# Patient Record
Sex: MALE | Race: WHITE | NOT HISPANIC OR LATINO | Employment: FULL TIME | URBAN - METROPOLITAN AREA
[De-identification: names, ages, dates, MRNs, and addresses within clinical notes are randomized per-mention and may not be internally consistent; named-entity substitution may affect disease eponyms.]

---

## 2019-07-31 ENCOUNTER — OFFICE VISIT (OUTPATIENT)
Dept: FAMILY MEDICINE CLINIC | Facility: CLINIC | Age: 59
End: 2019-07-31
Payer: COMMERCIAL

## 2019-07-31 VITALS
HEIGHT: 71 IN | DIASTOLIC BLOOD PRESSURE: 80 MMHG | HEART RATE: 80 BPM | WEIGHT: 198 LBS | BODY MASS INDEX: 27.72 KG/M2 | SYSTOLIC BLOOD PRESSURE: 140 MMHG | TEMPERATURE: 98.5 F | OXYGEN SATURATION: 97 %

## 2019-07-31 DIAGNOSIS — Z13.220 SCREENING FOR LIPOID DISORDERS: ICD-10-CM

## 2019-07-31 DIAGNOSIS — Z13.0 SCREENING FOR IRON DEFICIENCY ANEMIA: ICD-10-CM

## 2019-07-31 DIAGNOSIS — Z13.6 SCREENING FOR HYPERTENSION: ICD-10-CM

## 2019-07-31 DIAGNOSIS — Z13.1 SCREENING FOR DIABETES MELLITUS: ICD-10-CM

## 2019-07-31 DIAGNOSIS — Z00.01 ENCOUNTER FOR WELL ADULT EXAM WITH ABNORMAL FINDINGS: Primary | ICD-10-CM

## 2019-07-31 DIAGNOSIS — F17.210 SMOKING GREATER THAN 40 PACK YEARS: ICD-10-CM

## 2019-07-31 DIAGNOSIS — M25.50 MULTIPLE JOINT PAIN: ICD-10-CM

## 2019-07-31 DIAGNOSIS — Z23 NEED FOR PNEUMOCOCCAL VACCINATION: ICD-10-CM

## 2019-07-31 DIAGNOSIS — Z13.29 SCREENING FOR THYROID DISORDER: ICD-10-CM

## 2019-07-31 PROBLEM — M47.817 LUMBOSACRAL SPONDYLOSIS WITHOUT MYELOPATHY: Status: ACTIVE | Noted: 2019-07-31

## 2019-07-31 PROBLEM — M51.26 DISPLACEMENT OF LUMBAR INTERVERTEBRAL DISC WITHOUT MYELOPATHY: Status: ACTIVE | Noted: 2019-07-31

## 2019-07-31 PROBLEM — M50.90 CERVICAL DISC DISEASE: Status: ACTIVE | Noted: 2019-07-31

## 2019-07-31 PROCEDURE — 99213 OFFICE O/P EST LOW 20 MIN: CPT | Performed by: FAMILY MEDICINE

## 2019-07-31 PROCEDURE — 3008F BODY MASS INDEX DOCD: CPT | Performed by: FAMILY MEDICINE

## 2019-07-31 PROCEDURE — 90732 PPSV23 VACC 2 YRS+ SUBQ/IM: CPT | Performed by: FAMILY MEDICINE

## 2019-07-31 PROCEDURE — 99386 PREV VISIT NEW AGE 40-64: CPT | Performed by: FAMILY MEDICINE

## 2019-07-31 PROCEDURE — 4004F PT TOBACCO SCREEN RCVD TLK: CPT | Performed by: FAMILY MEDICINE

## 2019-07-31 PROCEDURE — 90471 IMMUNIZATION ADMIN: CPT | Performed by: FAMILY MEDICINE

## 2019-07-31 RX ORDER — SERTRALINE HYDROCHLORIDE 100 MG/1
TABLET, FILM COATED ORAL
COMMUNITY

## 2019-07-31 RX ORDER — TRAMADOL HYDROCHLORIDE 50 MG/1
TABLET ORAL
COMMUNITY

## 2019-07-31 RX ORDER — ZOLPIDEM TARTRATE 10 MG/1
TABLET ORAL
COMMUNITY

## 2019-07-31 RX ORDER — TADALAFIL 5 MG/1
TABLET ORAL
COMMUNITY

## 2019-07-31 RX ORDER — CYCLOBENZAPRINE HCL 10 MG
TABLET ORAL
COMMUNITY

## 2019-07-31 NOTE — PROGRESS NOTES
FAMILY PRACTICE HEALTH MAINTENANCE OFFICE VISIT  [facility] - [facility]  [facility]    NAME: Odell Lambert  AGE: 63 y o  SEX: male  : 1960     DATE: 2019    Assessment and Plan     Problem List Items Addressed This Visit        Other    Encounter for well adult exam with abnormal findings - Primary     Advice and education were given regarding nutrition, aerobic exercises, weight bearing exercises, cardiovascular risk reduction, fall risk reduction, and age appropriate supplements  The patient was counseled regarding instructions for management, risk factor reductions, prognosis, risks and benefits of treatment options, patient and family education, and importance of compliance with treatment  Will order PSA screening for prostate cancer also patient will have Pneumovax 23 today         Smoking greater than 40 pack years     Tobacco Use/Cessation  i     I advised patient to quit, and offered support  Discussed current use pattern  Asked patient to inform me when they set a quit date     At discussed with the patient complication of for smoking increase the risk of multiple kind of cancer he is aware  We discussed nicotine inhaler proper use patient agreed to try it proper use of medication possible side effect discussed with the patient  The also patient candidate for low dose CT scan of the chest screening for lung cancer we ordered today                       Relevant Medications    nicotine (NICOTROL) 10 MG inhaler    Other Relevant Orders    CT lung screening program    BMI 28 0-28 9,adult     The BMI is above average  BMI counseling and education was provided to the patient   Nutrition recommendations include reducing portion sizes, decreasing overall calorie intake, 3-5 servings of fruits/vegetables daily, reducing fast food intake, consuming healthier snacks, decreasing soda and/or juice intake, moderation in carbohydrate intake and reducing intake of saturated fat and trans fat  Exercise recommendations include moderate aerobic physical activity for 150 minutes/week, exercising 3-5 times per week and joining a gym           Multiple joint pain     New diagnosis symptomatic multiple small joint pain and swelling in bilateral hand plan to do joint workup include C-reactive protein SARMAD rheumatoid factor anti CCP, Lyme titer and vitamin-D e and x-ray of the the bilateral hand         Relevant Orders    XR hand 3+ vw left    XR hand 3+ vw right    Hepatitis C antibody    SARMAD Screen w/ Reflex to Titer/Pattern    C-reactive protein    Lyme Antibody Profile with reflex to WB    RF Screen w/ Reflex to Titer    Vitamin D 25 hydroxy    Cyclic citrul peptide antibody, IgG      Other Visit Diagnoses     Screening for lipoid disorders        Relevant Orders    PSA, Total Screen    CBC and differential    Comprehensive metabolic panel    Lipid Panel with Direct LDL reflex    TSH, 3rd generation with Free T4 reflex    Screening for diabetes mellitus        Relevant Orders    PSA, Total Screen    CBC and differential    Comprehensive metabolic panel    Lipid Panel with Direct LDL reflex    TSH, 3rd generation with Free T4 reflex    Screening for hypertension        Relevant Orders    PSA, Total Screen    CBC and differential    Comprehensive metabolic panel    Lipid Panel with Direct LDL reflex    TSH, 3rd generation with Free T4 reflex    Screening for iron deficiency anemia        Relevant Orders    PSA, Total Screen    CBC and differential    Comprehensive metabolic panel    Lipid Panel with Direct LDL reflex    TSH, 3rd generation with Free T4 reflex    Screening for thyroid disorder        Relevant Orders    PSA, Total Screen    CBC and differential    Comprehensive metabolic panel    Lipid Panel with Direct LDL reflex    TSH, 3rd generation with Free T4 reflex    Need for pneumococcal vaccination        Relevant Orders    PNEUMOCOCCAL POLYSACCHARIDE VACCINE 23-VALENT =>1YO SQ IM (Completed)            · Patient Counseling:   · Nutrition: Stressed importance of a well balanced diet, moderation of sodium/saturated fat, caloric balance and sufficient intake of fiber  · Exercise: Stressed the importance of regular exercise with a goal of 150 minutes per week  · Dental Health: Discussed daily flossing and brushing and regular dental visits     · Immunizations reviewed patient due for a Pneumovax Tdap he will have the PICC Pneumovax 23 today  · Discussed benefits of screening patient due for PSA screening for prostate cancer he did had his colonoscopy at age 49 patient is due for blood work screening for hyperlipidemia and diabetic the patient above age 55 he been smoking for almost 40 years patient is a candidate screening for lung cancer  ·    BMI Counseling: Body mass index is 28 01 kg/m²  Discussed with patient's BMI with him            Chief Complaint     Chief Complaint   Patient presents with    Physical Exam    Hand Pain     bilateral- knuckles       History of Present Illness     The patient here for establish care as a new patient and he had the concerned about the hand pain and swelling  Patient deny any chest pain short of breath no palpitation no headache no blurred vision no weakness or lateralized of the symptom no abdomen pain nausea vomiting or diarrhea no renal problem no rash no fever no change in the weight and no change in the mood patient does do exercise frequently he does not do any special diet patient patient does smoke half pack a day for 40 years he did try the Chantix before and he was unsuccessful to quit smoking  I review medication with the patient his family history surgical history      Well Adult Physical   Patient here for a comprehensive physical exam       Diet and Physical Activity  Diet: Regular   Exercise: frequently      Depression Screen  PHQ-9 Depression Screening    PHQ-9:    Frequency of the following problems over the past two weeks: Little interest or pleasure in doing things:  0 - not at all  Feeling down, depressed, or hopeless:  0 - not at all  PHQ-2 Score:  0          General Health  Hearing: Normal:  bilateral  Vision: previous LASIK surgery  Dental: regular dental visits      The following portions of the patient's history were reviewed and updated as appropriate: allergies, current medications, past family history, past medical history, past social history, past surgical history and problem list     Review of Systems     Review of Systems   Constitutional: Negative for fatigue and fever  HENT: Negative for ear pain, sinus pressure, sinus pain and sore throat  Eyes: Negative for pain and redness  Respiratory: Negative for cough, chest tightness and shortness of breath  Cardiovascular: Negative for chest pain, palpitations and leg swelling  Gastrointestinal: Negative for abdominal pain, blood in stool, constipation, diarrhea and nausea  Endocrine: Negative for heat intolerance and polydipsia  Genitourinary: Negative for flank pain, frequency and hematuria  Musculoskeletal: Positive for arthralgias and joint swelling  Negative for back pain  Skin: Negative for rash  Neurological: Negative for dizziness, numbness and headaches  Hematological: Does not bruise/bleed easily  Psychiatric/Behavioral: Negative for agitation and behavioral problems         Past Medical History     Past Medical History:   Diagnosis Date    Lumbosacral spondylosis without myelopathy 7/31/2019       Past Surgical History     Past Surgical History:   Procedure Laterality Date    SINUS SURGERY         Social History     Social History     Socioeconomic History    Marital status: Unknown     Spouse name: None    Number of children: None    Years of education: None    Highest education level: None   Occupational History    None   Social Needs    Financial resource strain: Not hard at all   [place] insecurity:     Worry: Never true     Inability: Never true    Transportation needs:     Medical: No     Non-medical: No   Tobacco Use    Smoking status: Current Every Day Smoker    Smokeless tobacco: Current User   Substance and Sexual Activity    Alcohol use: Not Currently    Drug use: Never    Sexual activity: Not Currently     Partners: Female   Lifestyle    Physical activity:     Days per week: 0 days     Minutes per session: 0 min    Stress: Not at all   Relationships    Social connections:     Talks on phone: More than three times a week     Gets together: More than three times a week     Attends Gnosticism service: Never     Active member of club or organization: No     Attends meetings of clubs or organizations: Never     Relationship status:     Intimate partner violence:     Fear of current or ex partner: No     Emotionally abused: No     Physically abused: No     Forced sexual activity: No   Other Topics Concern    None   Social History Narrative    None       Family History     Family History   Problem Relation Age of Onset    Breast cancer Mother     Heart disease Father     Cancer Father        Current Medications       Current Outpatient Medications:     cyclobenzaprine (FLEXERIL) 10 mg tablet, cyclobenzaprine 10 mg tablet  TAKE 1 TABLET BY MOUTH THREE TIMES A DAY AS NEEDED, Disp: , Rfl:     sertraline (ZOLOFT) 100 mg tablet, sertraline 100 mg tablet  TAKE 1 TABLET BY MOUTH EVERY DAY, Disp: , Rfl:     tadalafil (CIALIS) 5 MG tablet, tadalafil 5 mg tablet  TAKE 1 TABLET BY MOUTH EVERY DAY IF NEEDED, Disp: , Rfl:     traMADol (ULTRAM) 50 mg tablet, tramadol 50 mg tablet  TAKE 1 TO 2 TABLETS BY MOUTH TWICE A DAY AS NEEDED FOR PAIN, Disp: , Rfl:     zolpidem (AMBIEN) 10 mg tablet, zolpidem 10 mg tablet  TAKE 1 TABLET BY MOUTH AT BEDTIME, Disp: , Rfl:     nicotine (NICOTROL) 10 MG inhaler, Inhale 1 puff as needed for smoking cessation, Disp: 42 each, Rfl: 0     Allergies     Allergies   Allergen Reactions  Shellfish-Derived Products Hives     Other reaction(s): Respiratory distress       Objective     /80   Pulse 80   Temp 98 5 °F (36 9 °C) (Tympanic)   Ht 5' 10 5" (1 791 m)   Wt 89 8 kg (198 lb)   SpO2 97%   BMI 28 01 kg/m²      Physical Exam   Constitutional: He is oriented to person, place, and time  He appears well-developed and well-nourished  HENT:   Head: Normocephalic  Right Ear: External ear normal    Left Ear: External ear normal    Eyes: Conjunctivae and EOM are normal  Right eye exhibits no discharge  Left eye exhibits no discharge  Neck: No JVD present  Cardiovascular: Normal rate, regular rhythm and normal heart sounds  Exam reveals no gallop  No murmur heard  Pulmonary/Chest: Effort normal  No respiratory distress  He has no wheezes  He has no rales  He exhibits no tenderness  Abdominal: He exhibits no mass  There is no tenderness  There is no rebound  Musculoskeletal: He exhibits tenderness  He exhibits no edema  In small joints in B/L hands   Neurological: He is alert and oriented to person, place, and time  Skin: No rash noted  No erythema  Noel Beverly MD  [place] [facility]  BMI Counseling: Body mass index is 28 01 kg/m²  Discussed the patient's BMI with him  The BMI is above average  BMI counseling and education was provided to the patient  Nutrition recommendations include reducing portion sizes, decreasing overall calorie intake, 3-5 servings of fruits/vegetables daily, reducing fast food intake and consuming healthier snacks  Exercise recommendations include moderate aerobic physical activity for 150 minutes/week

## 2019-07-31 NOTE — PATIENT INSTRUCTIONS

## 2019-08-02 ENCOUNTER — APPOINTMENT (OUTPATIENT)
Dept: RADIOLOGY | Facility: MEDICAL CENTER | Age: 59
End: 2019-08-02
Payer: COMMERCIAL

## 2019-08-02 DIAGNOSIS — M25.50 MULTIPLE JOINT PAIN: ICD-10-CM

## 2019-08-02 PROBLEM — Z00.01 ENCOUNTER FOR WELL ADULT EXAM WITH ABNORMAL FINDINGS: Status: ACTIVE | Noted: 2019-08-02

## 2019-08-02 PROBLEM — F17.210 SMOKING GREATER THAN 40 PACK YEARS: Status: ACTIVE | Noted: 2019-07-31

## 2019-08-02 PROBLEM — F17.210 SMOKING GREATER THAN 40 PACK YEARS: Status: ACTIVE | Noted: 2019-08-02

## 2019-08-02 PROBLEM — Z00.01 ENCOUNTER FOR WELL ADULT EXAM WITH ABNORMAL FINDINGS: Status: ACTIVE | Noted: 2019-07-31

## 2019-08-02 PROCEDURE — 73130 X-RAY EXAM OF HAND: CPT

## 2019-08-02 NOTE — ASSESSMENT & PLAN NOTE
Tobacco Use/Cessation  i     I advised patient to quit, and offered support  Discussed current use pattern  Asked patient to inform me when they set a quit date     At discussed with the patient complication of for smoking increase the risk of multiple kind of cancer he is aware  We discussed nicotine inhaler proper use patient agreed to try it proper use of medication possible side effect discussed with the patient  The also patient candidate for low dose CT scan of the chest screening for lung cancer we ordered today

## 2019-08-02 NOTE — PROGRESS NOTES
Subjective:   Chief Complaint   Patient presents with    Physical Exam    Hand Pain     bilateral- knuckles        Patient ID: Odell Lambert is a 63 y o  male  The patient here for establish care as well exam and he is also concerned about the bilateral hand pain and swelling patient for the last couple months he notice swelling in the small joint in his bilateral hand and sometimes is stiffness and no redness no fall no trauma no dryness in the eye no dryness in the mouth no rash no muscle pain and he is not sure if there is any positive family history of rheumatoid arthritis in the family no abdomen pain no change in the color of the urine      The following portions of the patient's history were reviewed and updated as appropriate: allergies, current medications, past family history, past medical history, past social history, past surgical history and problem list     Review of Systems   Constitutional: Negative for fatigue and fever  HENT: Negative for ear pain, sinus pressure, sinus pain and sore throat  Eyes: Negative for pain and redness  Respiratory: Negative for cough, chest tightness and shortness of breath  Cardiovascular: Negative for chest pain, palpitations and leg swelling  Gastrointestinal: Negative for abdominal pain, blood in stool, constipation, diarrhea and nausea  Genitourinary: Negative for flank pain, frequency and hematuria  Musculoskeletal: Positive for arthralgias and joint swelling  Negative for back pain  Skin: Negative for rash  Neurological: Negative for dizziness, numbness and headaches  Hematological: Does not bruise/bleed easily  Objective:  Vitals:    07/31/19 1357   BP: 140/80   Pulse: 80   Temp: 98 5 °F (36 9 °C)   TempSrc: Tympanic   SpO2: 97%   Weight: 89 8 kg (198 lb)   Height: 5' 10 5" (1 791 m)      Physical Exam   Constitutional: He is oriented to person, place, and time  He appears well-developed and well-nourished     HENT:   Head: Normocephalic  Right Ear: External ear normal    Left Ear: External ear normal    Eyes: Conjunctivae and EOM are normal  Right eye exhibits no discharge  Left eye exhibits no discharge  Neck: No JVD present  Cardiovascular: Normal rate, regular rhythm and normal heart sounds  Exam reveals no gallop  No murmur heard  Pulmonary/Chest: Effort normal  No respiratory distress  He has no wheezes  He has no rales  He exhibits no tenderness  Abdominal: He exhibits no mass  There is no tenderness  There is no rebound  Musculoskeletal: He exhibits tenderness  He exhibits no edema  Positive tenderness and swelling on  small joint of bilateral hand   Neurological: He is alert and oriented to person, place, and time  Skin: No rash noted  No erythema  Assessment/Plan:    Encounter for well adult exam with abnormal findings  Advice and education were given regarding nutrition, aerobic exercises, weight bearing exercises, cardiovascular risk reduction, fall risk reduction, and age appropriate supplements  The patient was counseled regarding instructions for management, risk factor reductions, prognosis, risks and benefits of treatment options, patient and family education, and importance of compliance with treatment  Will order PSA screening for prostate cancer also patient will have Pneumovax 23 today    BMI 28 0-28 9,adult  The BMI is above average  BMI counseling and education was provided to the patient  Nutrition recommendations include reducing portion sizes, decreasing overall calorie intake, 3-5 servings of fruits/vegetables daily, reducing fast food intake, consuming healthier snacks, decreasing soda and/or juice intake, moderation in carbohydrate intake and reducing intake of saturated fat and trans fat  Exercise recommendations include moderate aerobic physical activity for 150 minutes/week, exercising 3-5 times per week and joining a gym      Smoking greater than 40 pack years  Tobacco Use/Cessation  i     I advised patient to quit, and offered support  Discussed current use pattern  Asked patient to inform me when they set a quit date     At discussed with the patient complication of for smoking increase the risk of multiple kind of cancer he is aware  We discussed nicotine inhaler proper use patient agreed to try it proper use of medication possible side effect discussed with the patient  The also patient candidate for low dose CT scan of the chest screening for lung cancer we ordered today                  Multiple joint pain  New diagnosis symptomatic multiple small joint pain and swelling in bilateral hand plan to do joint workup include C-reactive protein SARMAD rheumatoid factor anti CCP, Lyme titer and vitamin-D e and x-ray of the the bilateral hand       Diagnoses and all orders for this visit:    Encounter for well adult exam with abnormal findings    Smoking greater than 40 pack years  -     nicotine (NICOTROL) 10 MG inhaler; Inhale 1 puff as needed for smoking cessation  -     CT lung screening program; Future    BMI 28 0-28 9,adult    Multiple joint pain  -     XR hand 3+ vw left; Future  -     XR hand 3+ vw right; Future  -     Hepatitis C antibody; Future  -     SARMAD Screen w/ Reflex to Titer/Pattern; Future  -     C-reactive protein; Future  -     Lyme Antibody Profile with reflex to WB; Future  -     RF Screen w/ Reflex to Titer; Future  -     Vitamin D 25 hydroxy; Future  -     Cyclic citrul peptide antibody, IgG; Future    Screening for lipoid disorders  -     PSA, Total Screen; Future  -     CBC and differential; Future  -     Comprehensive metabolic panel; Future  -     Lipid Panel with Direct LDL reflex; Future  -     TSH, 3rd generation with Free T4 reflex; Future    Screening for diabetes mellitus  -     PSA, Total Screen; Future  -     CBC and differential; Future  -     Comprehensive metabolic panel; Future  -     Lipid Panel with Direct LDL reflex;  Future  -     TSH, 3rd generation with Free T4 reflex; Future    Screening for hypertension  -     PSA, Total Screen; Future  -     CBC and differential; Future  -     Comprehensive metabolic panel; Future  -     Lipid Panel with Direct LDL reflex; Future  -     TSH, 3rd generation with Free T4 reflex; Future    Screening for iron deficiency anemia  -     PSA, Total Screen; Future  -     CBC and differential; Future  -     Comprehensive metabolic panel; Future  -     Lipid Panel with Direct LDL reflex; Future  -     TSH, 3rd generation with Free T4 reflex; Future    Screening for thyroid disorder  -     PSA, Total Screen; Future  -     CBC and differential; Future  -     Comprehensive metabolic panel; Future  -     Lipid Panel with Direct LDL reflex; Future  -     TSH, 3rd generation with Free T4 reflex;  Future    Need for pneumococcal vaccination  -     PNEUMOCOCCAL POLYSACCHARIDE VACCINE 23-VALENT =>3YO SQ IM    Other orders  -     zolpidem (AMBIEN) 10 mg tablet; zolpidem 10 mg tablet   TAKE 1 TABLET BY MOUTH AT BEDTIME  -     traMADol (ULTRAM) 50 mg tablet; tramadol 50 mg tablet   TAKE 1 TO 2 TABLETS BY MOUTH TWICE A DAY AS NEEDED FOR PAIN  -     tadalafil (CIALIS) 5 MG tablet; tadalafil 5 mg tablet   TAKE 1 TABLET BY MOUTH EVERY DAY IF NEEDED  -     sertraline (ZOLOFT) 100 mg tablet; sertraline 100 mg tablet   TAKE 1 TABLET BY MOUTH EVERY DAY  -     cyclobenzaprine (FLEXERIL) 10 mg tablet; cyclobenzaprine 10 mg tablet   TAKE 1 TABLET BY MOUTH THREE TIMES A DAY AS NEEDED

## 2019-08-02 NOTE — ASSESSMENT & PLAN NOTE
Advice and education were given regarding nutrition, aerobic exercises, weight bearing exercises, cardiovascular risk reduction, fall risk reduction, and age appropriate supplements  The patient was counseled regarding instructions for management, risk factor reductions, prognosis, risks and benefits of treatment options, patient and family education, and importance of compliance with treatment       Will order PSA screening for prostate cancer also patient will have Pneumovax 23 today

## 2019-08-02 NOTE — ASSESSMENT & PLAN NOTE
New diagnosis symptomatic multiple small joint pain and swelling in bilateral hand plan to do joint workup include C-reactive protein SARMAD rheumatoid factor anti CCP, Lyme titer and vitamin-D e and x-ray of the the bilateral hand

## 2019-08-08 ENCOUNTER — HOSPITAL ENCOUNTER (OUTPATIENT)
Dept: RADIOLOGY | Age: 59
Discharge: HOME | End: 2019-08-08
Payer: COMMERCIAL

## 2019-08-08 DIAGNOSIS — F17.210 SMOKING GREATER THAN 40 PACK YEARS: ICD-10-CM

## 2019-08-08 PROCEDURE — G0297 LDCT FOR LUNG CA SCREEN: HCPCS

## 2019-08-23 LAB — HCV AB SER-ACNC: <0.1

## 2019-11-01 ENCOUNTER — OFFICE VISIT (OUTPATIENT)
Dept: FAMILY MEDICINE CLINIC | Facility: CLINIC | Age: 59
End: 2019-11-01
Payer: COMMERCIAL

## 2019-11-01 VITALS
BODY MASS INDEX: 26.6 KG/M2 | WEIGHT: 190 LBS | DIASTOLIC BLOOD PRESSURE: 102 MMHG | OXYGEN SATURATION: 98 % | HEIGHT: 71 IN | TEMPERATURE: 98.2 F | SYSTOLIC BLOOD PRESSURE: 158 MMHG | RESPIRATION RATE: 16 BRPM | HEART RATE: 87 BPM

## 2019-11-01 DIAGNOSIS — J44.9 COPD, MILD (HCC): Primary | ICD-10-CM

## 2019-11-01 DIAGNOSIS — R73.01 IFG (IMPAIRED FASTING GLUCOSE): ICD-10-CM

## 2019-11-01 DIAGNOSIS — M25.50 MULTIPLE JOINT PAIN: ICD-10-CM

## 2019-11-01 DIAGNOSIS — Z23 NEED FOR INFLUENZA VACCINATION: ICD-10-CM

## 2019-11-01 DIAGNOSIS — R03.0 ELEVATED BP WITHOUT DIAGNOSIS OF HYPERTENSION: ICD-10-CM

## 2019-11-01 DIAGNOSIS — K80.20 CALCULUS OF GALLBLADDER WITHOUT CHOLECYSTITIS WITHOUT OBSTRUCTION: ICD-10-CM

## 2019-11-01 LAB — SL AMB POCT HEMOGLOBIN AIC: 5.4 (ref ?–6.5)

## 2019-11-01 PROCEDURE — 90686 IIV4 VACC NO PRSV 0.5 ML IM: CPT | Performed by: FAMILY MEDICINE

## 2019-11-01 PROCEDURE — 90471 IMMUNIZATION ADMIN: CPT | Performed by: FAMILY MEDICINE

## 2019-11-01 PROCEDURE — 99214 OFFICE O/P EST MOD 30 MIN: CPT | Performed by: FAMILY MEDICINE

## 2019-11-01 PROCEDURE — 3008F BODY MASS INDEX DOCD: CPT | Performed by: FAMILY MEDICINE

## 2019-11-01 RX ORDER — METHYLPREDNISOLONE 4 MG/1
TABLET ORAL
COMMUNITY
End: 2019-12-16 | Stop reason: ALTCHOICE

## 2019-11-01 RX ORDER — ALBUTEROL SULFATE 90 UG/1
2 AEROSOL, METERED RESPIRATORY (INHALATION) EVERY 6 HOURS PRN
Qty: 1 INHALER | Refills: 1 | Status: SHIPPED | OUTPATIENT
Start: 2019-11-01

## 2019-11-01 NOTE — ASSESSMENT & PLAN NOTE
A new finding his blood pressure been control patient has been sick been coughing and he did not have a good sleep and he smoke before come to the office the we recommend the low-salt diet we will re-evaluate beat patient in 2 week will come back for nurse visit to re-evaluate his blood pressure if persistent to be high will start  medication

## 2019-11-01 NOTE — PROGRESS NOTES
Tobacco Cessation Counseling: Tobacco cessation counseling was provided  The patient is sincerely urged to quit consumption of tobacco  He is not ready to quit tobacco  Medication options not discussed  Subjective:   Chief Complaint   Patient presents with    Follow-up     chronic conditions        Patient ID: Jocy Peres is a 63 y o  male      Patient here follow-up with a chronic condition patient and and recently was diagnosis with the sinus infection at the urgent care and he been given prednisone Medrol pack and and and patient he does have a cough the and no wheezing no dyspnea on exertion and no lower extremity edema no decrease in oral intake no fever no chills the patient smoker and he continued to smoke no intention to stop smoking looking into his vital sign blood pressure is elevated repeated twice still high the patient asymptomatic no chest pain or short of breath no palpitation no TIA symptom no lower extremity edema no dyspnea on exertion and he say secondary to the cough he could not sleep well and he is congestion it and he did smoke before he come to the office  Patient who a complain from multiple joint pain workup has been done to the patient and including a blood work and x-ray of bilateral hand is negative patient persistent to have a pain and swelling  The recent blood work discussed with the patient show his fasting sugar is 127 he deny increased thirsty increased frequency urination no dizziness no abdomen pain no family history of diabetic we did hemoglobin A1c in the office is 5 4  Also low dose CT scan screen for lung cancer and smoker patient had was done result discussed with the patient incident finding cholelithiasis patient deny any abdomen pain no nausea no vomiting and no weight loss also it show mild COPD the patient does have a long history of smoking deny any wheezing no short of breast patient does have a cough recently and he related to his sinus infection      The following portions of the patient's history were reviewed and updated as appropriate: allergies, current medications, past family history, past medical history, past social history, past surgical history and problem list     Review of Systems   Constitutional: Negative for fatigue and fever  HENT: Negative for ear pain, sinus pressure, sinus pain and sore throat  Eyes: Negative for pain and redness  Respiratory: Negative for cough, chest tightness and shortness of breath  Cardiovascular: Negative for chest pain, palpitations and leg swelling  Gastrointestinal: Negative for abdominal pain, blood in stool, constipation, diarrhea and nausea  Genitourinary: Negative for flank pain, frequency and hematuria  Musculoskeletal: Negative for back pain and joint swelling  Skin: Negative for rash  Neurological: Negative for dizziness, numbness and headaches  Hematological: Does not bruise/bleed easily  Objective:  Vitals:    11/01/19 0741   BP: (!) 158/102   BP Location: Left arm   Patient Position: Sitting   Cuff Size: Large   Pulse: 87   Resp: 16   Temp: 98 2 °F (36 8 °C)   TempSrc: Tympanic   SpO2: 98%   Weight: 86 2 kg (190 lb)   Height: 5' 10 5" (1 791 m)      Physical Exam   Constitutional: He is oriented to person, place, and time  He appears well-developed and well-nourished  HENT:   Head: Normocephalic  Right Ear: External ear normal    Left Ear: External ear normal    Eyes: Conjunctivae and EOM are normal  Right eye exhibits no discharge  Left eye exhibits no discharge  Neck: No JVD present  Cardiovascular: Normal rate, regular rhythm and normal heart sounds  Exam reveals no gallop  No murmur heard  Pulmonary/Chest: Effort normal  No respiratory distress  He has no wheezes  He has no rales  He exhibits no tenderness  Abdominal: He exhibits no mass  There is no tenderness  There is no rebound  Musculoskeletal: He exhibits no edema or tenderness     Neurological: He is alert and oriented to person, place, and time  Skin: No rash noted  No erythema  Assessment/Plan:    COPD, mild (HCC)  New diagnosis symptomatic with the cough finding on screening CT scan of the chest the patient had a chronic history of smoking and the will start the patient on and no one inhaler once a day also start him on Ventolin p r n  for cough and short of breath  Smoking cessation discussed with the patient proper use of medication possible side effect also  Patient had a flu shot today possible side effect discussed with the patient    Calculus of gallbladder without cholecystitis without obstruction  New diagnosis incidental finding on the CT scan of the chest the patient asymptomatic a proper diet discussed with the patient [facility] symptom to call the office    Elevated BP without diagnosis of hypertension  A new finding his blood pressure been control patient has been sick been coughing and he did not have a good sleep and he smoke before come to the office the we recommend the low-salt diet we will re-evaluate beat patient in 2 week will come back for nurse visit to re-evaluate his blood pressure if persistent to be high will start  medication    IFG (impaired fasting glucose)  A new diagnosis asymptomatic his hemoglobin A1c is 5 4  The discussed with the patient will hydration and low carb diet and important lose weight    Multiple joint pain  A chronic symptomatic recent workup including x-ray and the blood work was negative for refer the patient to see Rheumatology       Diagnoses and all orders for this visit:    COPD, mild ([address] )  -     umeclidinium-vilanterol (ANORO ELLIPTA) 62 5-25 MCG/INH inhaler; Inhale 1 puff daily  -     albuterol (VENTOLIN HFA) 90 mcg/act inhaler; Inhale 2 puffs every 6 (six) hours as needed for wheezing  -     CBC and differential; Future  -     Basic metabolic panel; Future  -     Lipid panel;  Future    Calculus of gallbladder without cholecystitis without obstruction  -     CBC and differential; Future  -     Basic metabolic panel; Future  -     Lipid panel; Future    Need for influenza vaccination  -     FLUZONE: influenza vaccine, quadrivalent, 0 5 mL    IFG (impaired fasting glucose)    Elevated BP without diagnosis of hypertension    Multiple joint pain  -     Ambulatory referral to Rheumatology; Future  -     CBC and differential; Future  -     Basic metabolic panel; Future  -     Lipid panel; Future    Other orders  -     methylPREDNISolone (MEDROL) 4 MG tablet therapy pack; Medrol (Kishore) 4 mg tablets in a dose pack   Take 1 dose pk by oral route as directed

## 2019-11-01 NOTE — ASSESSMENT & PLAN NOTE
A new diagnosis asymptomatic his hemoglobin A1c is 5 4  The discussed with the patient will hydration and low carb diet and important lose weight

## 2019-11-01 NOTE — ASSESSMENT & PLAN NOTE
A chronic symptomatic recent workup including x-ray and the blood work was negative for refer the patient to see Rheumatology

## 2019-11-01 NOTE — ASSESSMENT & PLAN NOTE
New diagnosis symptomatic with the cough finding on screening CT scan of the chest the patient had a chronic history of smoking and the will start the patient on and no one inhaler once a day also start him on Ventolin p r n  for cough and short of breath  Smoking cessation discussed with the patient proper use of medication possible side effect also  Patient had a flu shot today possible side effect discussed with the patient

## 2019-11-01 NOTE — ASSESSMENT & PLAN NOTE
New diagnosis incidental finding on the CT scan of the chest the patient asymptomatic a proper diet discussed with the patient [facility] symptom to call the office

## 2019-11-15 ENCOUNTER — OFFICE VISIT (OUTPATIENT)
Dept: FAMILY MEDICINE CLINIC | Facility: CLINIC | Age: 59
End: 2019-11-15
Payer: COMMERCIAL

## 2019-11-15 ENCOUNTER — CLINICAL SUPPORT (OUTPATIENT)
Dept: FAMILY MEDICINE CLINIC | Facility: CLINIC | Age: 59
End: 2019-11-15
Payer: COMMERCIAL

## 2019-11-15 VITALS — SYSTOLIC BLOOD PRESSURE: 140 MMHG | DIASTOLIC BLOOD PRESSURE: 100 MMHG

## 2019-11-15 DIAGNOSIS — I10 ESSENTIAL HYPERTENSION: Primary | ICD-10-CM

## 2019-11-15 PROCEDURE — 4004F PT TOBACCO SCREEN RCVD TLK: CPT | Performed by: FAMILY MEDICINE

## 2019-11-15 PROCEDURE — 99213 OFFICE O/P EST LOW 20 MIN: CPT | Performed by: FAMILY MEDICINE

## 2019-11-15 RX ORDER — LISINOPRIL 10 MG/1
10 TABLET ORAL DAILY
Qty: 30 TABLET | Refills: 1 | Status: SHIPPED | OUTPATIENT
Start: 2019-11-15 | End: 2019-12-16 | Stop reason: SINTOL

## 2019-11-15 NOTE — PROGRESS NOTES
Subjective:   No chief complaint on file  Patient ID: Zach Chau is a 63 y o  male  Patient was here for nurse visit the recheck blood pressure his blood pressure and to occasion was elevated today blood pressure 140/100 the still elevated the patient deny any family history of heart high blood pressure but he had a positive family history of stroke and heart show patient deny any chest pain short of breath no palpitation no headache no blurred vision no weakness no lateralized of the symptom no abdomen pain no blood in the urine      The following portions of the patient's history were reviewed and updated as appropriate: allergies, current medications, past family history, past medical history, past social history, past surgical history and problem list     Review of Systems   Constitutional: Negative for fatigue and fever  HENT: Negative for ear pain, sinus pressure, sinus pain and sore throat  Eyes: Negative for pain and redness  Respiratory: Negative for cough, chest tightness and shortness of breath  Cardiovascular: Negative for chest pain, palpitations and leg swelling  Gastrointestinal: Negative for abdominal pain, blood in stool, constipation, diarrhea and nausea  Genitourinary: Negative for flank pain, frequency and hematuria  Musculoskeletal: Negative for back pain and joint swelling  Skin: Negative for rash  Neurological: Negative for dizziness, numbness and headaches  Hematological: Does not bruise/bleed easily  Objective: There were no vitals filed for this visit  Physical Exam   Constitutional: He is oriented to person, place, and time  He appears well-developed and well-nourished  HENT:   Head: Normocephalic  Right Ear: External ear normal    Left Ear: External ear normal    Eyes: Conjunctivae and EOM are normal  Right eye exhibits no discharge  Left eye exhibits no discharge  Neck: No JVD present     Cardiovascular: Normal rate, regular rhythm and normal heart sounds  Exam reveals no gallop  No murmur heard  Pulmonary/Chest: Effort normal  No respiratory distress  He has no wheezes  He has no rales  He exhibits no tenderness  Abdominal: He exhibits no mass  There is no tenderness  There is no rebound  Musculoskeletal: He exhibits no edema or tenderness  Neurological: He is alert and oriented to person, place, and time  Skin: No rash noted  No erythema           Assessment/Plan:    Essential hypertension  A new diagnosis asymptomatic will start the patient on lisinopril 10 mg once a day proper use of medication possible side effect discussed with the patient low-salt diet and increased physical activity       Diagnoses and all orders for this visit:    Essential hypertension

## 2019-11-15 NOTE — ASSESSMENT & PLAN NOTE
A new diagnosis asymptomatic will start the patient on lisinopril 10 mg once a day proper use of medication possible side effect discussed with the patient low-salt diet and increased physical activity

## 2019-12-16 ENCOUNTER — OFFICE VISIT (OUTPATIENT)
Dept: FAMILY MEDICINE CLINIC | Facility: CLINIC | Age: 59
End: 2019-12-16
Payer: COMMERCIAL

## 2019-12-16 VITALS
RESPIRATION RATE: 16 BRPM | HEIGHT: 71 IN | HEART RATE: 97 BPM | SYSTOLIC BLOOD PRESSURE: 118 MMHG | DIASTOLIC BLOOD PRESSURE: 82 MMHG | TEMPERATURE: 97.7 F | OXYGEN SATURATION: 99 % | WEIGHT: 188 LBS | BODY MASS INDEX: 26.32 KG/M2

## 2019-12-16 DIAGNOSIS — T46.4X5A COUGH DUE TO ACE INHIBITOR: ICD-10-CM

## 2019-12-16 DIAGNOSIS — I10 ESSENTIAL HYPERTENSION: Primary | ICD-10-CM

## 2019-12-16 DIAGNOSIS — R05.8 COUGH DUE TO ACE INHIBITOR: ICD-10-CM

## 2019-12-16 DIAGNOSIS — J32.9 RECURRENT SINUSITIS: ICD-10-CM

## 2019-12-16 PROCEDURE — 3008F BODY MASS INDEX DOCD: CPT | Performed by: FAMILY MEDICINE

## 2019-12-16 PROCEDURE — 4004F PT TOBACCO SCREEN RCVD TLK: CPT | Performed by: FAMILY MEDICINE

## 2019-12-16 PROCEDURE — 99214 OFFICE O/P EST MOD 30 MIN: CPT | Performed by: FAMILY MEDICINE

## 2019-12-16 PROCEDURE — 3079F DIAST BP 80-89 MM HG: CPT | Performed by: FAMILY MEDICINE

## 2019-12-16 PROCEDURE — 3074F SYST BP LT 130 MM HG: CPT | Performed by: FAMILY MEDICINE

## 2019-12-16 RX ORDER — LOSARTAN POTASSIUM 25 MG/1
25 TABLET ORAL DAILY
Qty: 90 TABLET | Refills: 0 | Status: SHIPPED | OUTPATIENT
Start: 2019-12-16 | End: 2020-01-08 | Stop reason: SDUPTHER

## 2019-12-16 RX ORDER — FLUTICASONE PROPIONATE 50 MCG
2 SPRAY, SUSPENSION (ML) NASAL DAILY
Qty: 16 G | Refills: 0 | Status: SHIPPED | OUTPATIENT
Start: 2019-12-16 | End: 2020-01-08 | Stop reason: SDUPTHER

## 2019-12-16 NOTE — ASSESSMENT & PLAN NOTE
Asymptomatic recurrent plan to give him Flonase nasal spray 2 spray in each nostril once a day for 2 week will refer patient to see ENT

## 2019-12-16 NOTE — ASSESSMENT & PLAN NOTE
Chronic asymptomatic fair control patient had intolerance to the Ace inhibitor cough for we stop it will put him on losartan 25 mg once a day proper use discussed with the patient

## 2019-12-16 NOTE — PROGRESS NOTES
Tobacco Cessation Counseling: Tobacco cessation counseling was provided  The patient is sincerely urged to quit consumption of tobacco  He is not ready to quit tobacco        Subjective:   Chief Complaint   Patient presents with    Follow-up     chronic conditions        Patient ID: James Jay is a 63 y o  male  Patient and office concerned about cough the it start the almost 1 months ago and he describe it as a dry cough for with the ticklish and his throat the no fever no chills no body ache no hematosis and no short of breath no chest pain no dyspnea on exertion or lower extremity edema patient was diagnosed with hypertension and start the recently on lisinopril 10 mg and it is the same time he start having the cough  Patient now also had a history of the acute sinusitis and the it is recurrent previously had surgery on his sinus and but he feel symptom coming back and he feel pressure on his face the postnasal drip and runny nose and no fever chills chills no bloody ache no decrease in oral intake no nausea vomiting and no abdomen pain      The following portions of the patient's history were reviewed and updated as appropriate: allergies, current medications, past family history, past medical history, past social history, past surgical history and problem list     Review of Systems   Constitutional: Negative for fatigue and fever  HENT: Positive for congestion, postnasal drip and sinus pressure  Negative for ear pain, sinus pain and sore throat  Eyes: Negative for pain and redness  Respiratory: Positive for cough  Negative for chest tightness and shortness of breath  Cardiovascular: Negative for chest pain, palpitations and leg swelling  Gastrointestinal: Negative for abdominal pain, blood in stool, constipation, diarrhea and nausea  Genitourinary: Negative for flank pain, frequency and hematuria  Musculoskeletal: Negative for back pain and joint swelling  Skin: Negative for rash  Neurological: Negative for dizziness, numbness and headaches  Hematological: Does not bruise/bleed easily  Objective:  Vitals:    12/16/19 0808   BP: 118/82   BP Location: Left arm   Patient Position: Sitting   Cuff Size: Large   Pulse: 97   Resp: 16   Temp: 97 7 °F (36 5 °C)   TempSrc: Tympanic   SpO2: 99%   Weight: 85 3 kg (188 lb)   Height: 5' 10 5" (1 791 m)      Physical Exam   Constitutional: He is oriented to person, place, and time  He appears well-developed and well-nourished  HENT:   Head: Normocephalic  Right Ear: External ear normal    Left Ear: External ear normal    Eyes: Conjunctivae and EOM are normal  Right eye exhibits no discharge  Left eye exhibits no discharge  Neck: No JVD present  Cardiovascular: Normal rate, regular rhythm and normal heart sounds  Exam reveals no gallop  No murmur heard  Pulmonary/Chest: Effort normal  No respiratory distress  He has no wheezes  He has no rales  He exhibits no tenderness  Abdominal: He exhibits no mass  There is no tenderness  There is no rebound  Musculoskeletal: He exhibits no edema or tenderness  Neurological: He is alert and oriented to person, place, and time  Skin: No rash noted  No erythema  Assessment/Plan:    Cough due to ACE inhibitor  A new diagnosis symptomatic secondary to lisinopril will stop lisinopril we discussed the patient and possible side effect of the Ace inhibitor is a dry cough    Recurrent sinusitis  Asymptomatic recurrent plan to give him Flonase nasal spray 2 spray in each nostril once a day for 2 week will refer patient to see ENT    Essential hypertension  Chronic asymptomatic fair control patient had intolerance to the Ace inhibitor cough for we stop it will put him on losartan 25 mg once a day proper use discussed with the patient       Diagnoses and all orders for this visit:    Essential hypertension  -     losartan (COZAAR) 25 mg tablet;  Take 1 tablet (25 mg total) by mouth daily    Cough due to ACE inhibitor    Recurrent sinusitis  -     Ambulatory Referral to Otolaryngology;  Future  -     fluticasone (FLONASE) 50 mcg/act nasal spray; 2 sprays into each nostril daily

## 2019-12-16 NOTE — ASSESSMENT & PLAN NOTE
A new diagnosis symptomatic secondary to lisinopril will stop lisinopril we discussed the patient and possible side effect of the Ace inhibitor is a dry cough

## 2019-12-24 NOTE — TELEPHONE ENCOUNTER
Incoming fax request for lisinopril notified pharmacy patient no longer on medication please remove from medication profile stopped due to cough

## 2020-01-08 DIAGNOSIS — J32.9 RECURRENT SINUSITIS: ICD-10-CM

## 2020-01-08 DIAGNOSIS — I10 ESSENTIAL HYPERTENSION: ICD-10-CM

## 2020-01-08 RX ORDER — TADALAFIL 5 MG/1
5 TABLET ORAL DAILY PRN
Qty: 10 TABLET | Refills: 1 | Status: CANCELLED | OUTPATIENT
Start: 2020-01-08

## 2020-01-10 RX ORDER — LOSARTAN POTASSIUM 25 MG/1
25 TABLET ORAL DAILY
Qty: 90 TABLET | Refills: 0 | Status: SHIPPED | OUTPATIENT
Start: 2020-01-10 | End: 2020-02-04

## 2020-01-10 RX ORDER — FLUTICASONE PROPIONATE 50 MCG
2 SPRAY, SUSPENSION (ML) NASAL DAILY
Qty: 16 G | Refills: 0 | Status: SHIPPED | OUTPATIENT
Start: 2020-01-10 | End: 2020-02-03

## 2020-01-16 ENCOUNTER — CONSULT (OUTPATIENT)
Dept: RHEUMATOLOGY | Facility: CLINIC | Age: 60
End: 2020-01-16
Payer: COMMERCIAL

## 2020-01-16 VITALS — WEIGHT: 199 LBS | BODY MASS INDEX: 28.49 KG/M2 | HEART RATE: 72 BPM | RESPIRATION RATE: 18 BRPM | HEIGHT: 70 IN

## 2020-01-16 DIAGNOSIS — Z87.39 HISTORY OF GOUT: ICD-10-CM

## 2020-01-16 DIAGNOSIS — M25.50 MULTIPLE JOINT PAIN: Primary | ICD-10-CM

## 2020-01-16 DIAGNOSIS — M19.90 OSTEOARTHRITIS, UNSPECIFIED OSTEOARTHRITIS TYPE, UNSPECIFIED SITE: ICD-10-CM

## 2020-01-16 PROCEDURE — 99244 OFF/OP CNSLTJ NEW/EST MOD 40: CPT | Performed by: INTERNAL MEDICINE

## 2020-01-16 PROCEDURE — 3008F BODY MASS INDEX DOCD: CPT | Performed by: INTERNAL MEDICINE

## 2020-01-16 NOTE — PROGRESS NOTES
Assessment and Plan: Sofia Pritchett is a 63 y o  male who presents as a Rheumatology consult referred by his PCP Emily Mitchell MD for evaluation of possible inflammatory arthritis, especially in his hands  Ordered uric acid level, ESR, and anti-CCP for further work-up for possible inflammatory arthritis - gout vs  Rheumatoid arthritis  For now, have prescribed diclofenac gel to apply to painful joints as needed; his thumb symptoms seem to be more related to osteoarthritis  Plan:  Diagnoses and all orders for this visit:    Multiple joint pain  -     Ambulatory referral to Rheumatology  -     Uric acid  -     Sedimentation rate, automated  -     Cyclic citrul peptide antibody, IgG  -     diclofenac sodium (VOLTAREN) 1 %; Apply 2 g topically 4 (four) times a day as needed (pain)    Osteoarthritis, unspecified osteoarthritis type, unspecified site  -     diclofenac sodium (VOLTAREN) 1 %; Apply 2 g topically 4 (four) times a day as needed (pain)    History of gout  -     Uric acid    Follow-up plan: Return to clinic in 6 months      HPI  Sofia Pritchett is a 63 y o   male who presents as a Rheumatology consult referred by his PCP Emily Mitchell MD for evaluation of possible inflammatory arthritis  Patient admits that he gets swelling in his joints that is worse later in the day, but they do not get red or warm  He has had right big toe gout flares in the past; started 20 years ago, last major flare was 16 years ago; gets small flares in his toes about one a week, which he attributes to his dress shoes  He take cyclobenzaprine about 4-5 days a month for muscle spasms  Patient has noticed that his thumbs have been bothering him for a year  Last year, his PIPs appeared "inflammed"  He golfs a couple of times a week; has noticed difficulty opening or grabbing things   His pain is worse in the morning in his hands; admits to 30 minutes to 1 hour of morning stiffness, stiffness goes away but pain remains throughout the day though not as bad  Patient takes tramadol for his back, but it does not help with the hand pain  Review of Systems  Review of Systems   Constitutional: Negative for chills, fatigue, fever and unexpected weight change  HENT: Negative for mouth sores and trouble swallowing  Eyes: Negative for pain and visual disturbance  Respiratory: Negative for cough and shortness of breath  Cardiovascular: Negative for chest pain and leg swelling  Gastrointestinal: Negative for abdominal pain, blood in stool, constipation, diarrhea and nausea  Genitourinary: Negative for hematuria  Musculoskeletal: Positive for arthralgias, back pain and neck pain  Negative for joint swelling and myalgias  Skin: Negative for rash  Neurological: Negative for weakness and numbness  Hematological: Negative for adenopathy  Psychiatric/Behavioral: Negative for sleep disturbance         Allergies  Allergies   Allergen Reactions    Shellfish-Derived Products Hives     Other reaction(s): Respiratory distress    Lisinopril Cough       Home Medications    Current Outpatient Medications:     albuterol (VENTOLIN HFA) 90 mcg/act inhaler, Inhale 2 puffs every 6 (six) hours as needed for wheezing, Disp: 1 Inhaler, Rfl: 1    cyclobenzaprine (FLEXERIL) 10 mg tablet, cyclobenzaprine 10 mg tablet  TAKE 1 TABLET BY MOUTH THREE TIMES A DAY AS NEEDED, Disp: , Rfl:     sertraline (ZOLOFT) 100 mg tablet, sertraline 100 mg tablet  TAKE 1 TABLET BY MOUTH EVERY DAY, Disp: , Rfl:     tadalafil (CIALIS) 5 MG tablet, tadalafil 5 mg tablet  TAKE 1 TABLET BY MOUTH EVERY DAY IF NEEDED, Disp: , Rfl:     traMADol (ULTRAM) 50 mg tablet, tramadol 50 mg tablet  TAKE 1 TO 2 TABLETS BY MOUTH TWICE A DAY AS NEEDED FOR PAIN, Disp: , Rfl:     umeclidinium-vilanterol (ANORO ELLIPTA) 62 5-25 MCG/INH inhaler, Inhale 1 puff daily, Disp: 3 Inhaler, Rfl: 3    zolpidem (AMBIEN) 10 mg tablet, zolpidem 10 mg tablet  TAKE 1 TABLET BY MOUTH AT BEDTIME, Disp: , Rfl:     diclofenac sodium (VOLTAREN) 1 %, Apply 2 g topically 4 (four) times a day as needed (pain), Disp: 100 g, Rfl: 6    fluticasone (FLONASE) 50 mcg/act nasal spray, SPRAY 2 SPRAYS INTO EACH NOSTRIL EVERY DAY, Disp: 48 mL, Rfl: 0    losartan (COZAAR) 25 mg tablet, TAKE 1 TABLET BY MOUTH EVERY DAY, Disp: 30 tablet, Rfl: 0    Past Medical History  Past Medical History:   Diagnosis Date    COPD, mild ([address] ) 11/1/2019    Essential hypertension 11/15/2019    Lumbosacral spondylosis without myelopathy 7/31/2019    Nasal septal perforation        Past Surgical History   Past Surgical History:   Procedure Laterality Date    SEPTOPLASTY      age 41 - resulted in septal perforation    SINUS SURGERY      age 41 - removal of polyps       Family History    Family History   Problem Relation Age of Onset   [facility] Breast cancer Mother     Heart disease Father     Cancer Father    mother - likely osteoarthritis, thyroid disease    Social History  Occupation: computer work,   Social History     Substance and Sexual Activity   Alcohol Use Not Currently    Frequency: Never    Binge frequency: Never     Social History     Substance and Sexual Activity   Drug Use Never     Social History     Tobacco Use   Smoking Status Current Every Day Smoker    Packs/day: 1 00    Types: Cigarettes   Smokeless Tobacco Current User   has smoked 1 PPD of cigarettes for 40 years      Objective:  Vitals:    01/16/20 1008   Pulse: 72   Resp: 18   Weight: 90 3 kg (199 lb)   Height: 5' 10" (1 778 m)       Physical Exam   Constitutional: He appears well-developed and well-nourished  He is cooperative  No distress  HENT:   Head: Normocephalic and atraumatic  Mouth/Throat: Oropharynx is clear and moist and mucous membranes are normal    Eyes: Conjunctivae, EOM and lids are normal  No scleral icterus  Neck: Neck supple  No spinous process tenderness and no muscular tenderness present  No thyromegaly present     Cardiovascular: Normal rate, regular rhythm, S1 normal and S2 normal  Exam reveals no friction rub  No murmur heard  Pulmonary/Chest: Effort normal and breath sounds normal  No tachypnea  No respiratory distress  He has no wheezes  He has no rhonchi  He has no rales  He exhibits no tenderness  Musculoskeletal: He exhibits tenderness  Bilateral 1st CMC tenderness   Lymphadenopathy:        Head (right side): No submental and no submandibular adenopathy present  Head (left side): No submental and no submandibular adenopathy present  He has no cervical adenopathy  Neurological: He is alert  He has normal strength  No sensory deficit  Skin: Skin is warm and dry  No rash noted  Nails show no clubbing  Psychiatric: He has a normal mood and affect  His behavior is normal          Reviewed labs and imaging      Imaging:   Bilateral Hand x-rays 8/2/19: unremarkable    Labs:   Office Visit on 11/01/2019   Component Date Value Ref Range Status    Hemoglobin A1C 11/01/2019 5 4  6 5 Final   Orders Only on 08/23/2019   Component Date Value Ref Range Status    HEP C AB 08/23/2019 <0 1   Final

## 2020-01-16 NOTE — PATIENT INSTRUCTIONS
Do bloodwork as soon as possible  Use diclofenac gel on painful joints as needed    Return to clinic in 6 months    Arthritis   AMBULATORY CARE:   Arthritis  is a disease that causes inflammation in one or more joints  There are many types of arthritis, such as osteoarthritis, rheumatoid arthritis, and septic arthritis  Some types cause inflammation in the joints  Other types wear away the cartilage between joints  This makes the bones of the joint rub together when you move the joint  Your symptoms may be constant, or symptoms may come and go  Arthritis often gets worse over time and can cause permanent joint damage  Common signs and symptoms of arthritis:   · Pain, swelling, or stiffness in the joint    · Limited range of motion in the joint    · Warmth or redness over the joint    · Tenderness when you touch the joint    · Stiff joints in the morning that loosen with movement    · A creaking or grinding sound when you move the joint    · Fever  Seek care immediately if:   · You have a fever and severe joint pain or swelling  · You cannot move the affected joint  · You have severe joint pain you cannot tolerate  Contact your healthcare provider if:   · Your pain or swelling does not get better with treatment  · You have questions or concerns about your condition or care  Treatment  will depend on the type of arthritis you have and if it is severe  You may need any of the following:  · Acetaminophen  decreases pain and fever  It is available without a doctor's order  Ask how much to take and how often to take it  Follow directions  Acetaminophen can cause liver damage if not taken correctly  · NSAIDs , such as ibuprofen, help decrease swelling, pain, and fever  This medicine is available with or without a doctor's order  NSAIDs can cause stomach bleeding or kidney problems in certain people  If you take blood thinner medicine, always ask your healthcare provider if NSAIDs are safe for you   Always read the medicine label and follow directions  · Steroid medicine  helps reduce swelling and pain  · Surgery  may be needed to repair or replace a damaged joint  Manage arthritis:   · Rest your painful joint so it can heal   Your healthcare provider may recommend crutches or a walker if the affected joint is in a leg  · Apply ice or heat to the joint  Both can help decrease swelling and pain  Ice may also help prevent tissue damage  Use an ice pack, or put crushed ice in a plastic bag  Cover it with a towel and place it on your joint for 15 to 20 minutes every hour or as directed  You can apply heat for 20 minutes every 2 hours  Heat treatment includes hot packs or heat lamps  · Elevate your joint  Elevation helps reduce swelling and pain  Raise your joint above the level of your heart as often as you can  Prop your painful joint on pillows to keep it above your heart comfortably  · Go to physical or occupational therapy as directed  A physical therapist can teach you exercises to improve flexibility and range of motion  You may also be shown non-weight-bearing exercises that are safe for your joints, such as swimming  Exercise can help keep your joints flexible and reduce pain  An occupational therapist can help you learn to do your daily activities when your joints are stiff or sore  · Maintain a healthy weight  Extra weight puts increased pressure on your joints  Ask your healthcare provider what you should weigh  If you need to lose weight, he can help you create a weight loss program  Weight loss can help reduce pain and increase your ability to do your activities  The amount of exercise you do may vary each day, depending on your symptoms  · Wear flat or low-heeled shoes  This will help decrease pain and reduce pressure on your ankle, knee, and hip joints  · Use support devices as directed    You may be given splints to wear on your hands to help your joints rest and to decrease inflammation  While you sleep, use a pillow that is firm enough to support your neck and head  Other equipment  that may help you move and prevent falls:  · Orthotic shoes or insoles  help support your feet when you walk  · Crutches, a cane, or a walker  may help decrease your risk for falling  They also decrease stress on affected joints  · Devices to prevent falls  include raised toilet seats and bathtub bars to help you get up from sitting  Handrails can be placed in areas where you need balance and support  Follow up with your healthcare provider or rheumatologist as directed:  Write down your questions so you remember to ask them during your visits  © 2017 [facility] Information is for End User's use only and may not be sold, redistributed or otherwise used for commercial purposes  All illustrations and images included in CareNotes® are the copyrighted property of [employer]  or [employer]  The above information is an  only  It is not intended as medical advice for individual conditions or treatments  Talk to your doctor, nurse or pharmacist before following any medical regimen to see if it is safe and effective for you

## 2020-02-03 DIAGNOSIS — J32.9 RECURRENT SINUSITIS: ICD-10-CM

## 2020-02-03 RX ORDER — FLUTICASONE PROPIONATE 50 MCG
SPRAY, SUSPENSION (ML) NASAL
Qty: 48 ML | Refills: 0 | Status: SHIPPED | OUTPATIENT
Start: 2020-02-03

## 2020-02-04 DIAGNOSIS — I10 ESSENTIAL HYPERTENSION: ICD-10-CM

## 2020-02-04 RX ORDER — LOSARTAN POTASSIUM 25 MG/1
TABLET ORAL
Qty: 30 TABLET | Refills: 0 | Status: SHIPPED | OUTPATIENT
Start: 2020-02-04 | End: 2020-03-01

## 2020-03-01 DIAGNOSIS — I10 ESSENTIAL HYPERTENSION: ICD-10-CM

## 2020-03-01 RX ORDER — LOSARTAN POTASSIUM 25 MG/1
TABLET ORAL
Qty: 30 TABLET | Refills: 0 | Status: SHIPPED | OUTPATIENT
Start: 2020-03-01 | End: 2020-03-09 | Stop reason: SDUPTHER

## 2020-03-06 ENCOUNTER — TELEPHONE (OUTPATIENT)
Dept: RHEUMATOLOGY | Facility: CLINIC | Age: 60
End: 2020-03-06

## 2020-03-06 PROBLEM — Z87.39 HISTORY OF GOUT: Status: ACTIVE | Noted: 2020-03-06

## 2020-03-06 PROBLEM — M19.90 OSTEOARTHRITIS: Status: ACTIVE | Noted: 2020-03-06

## 2020-03-06 NOTE — TELEPHONE ENCOUNTER
Please remind patient to complete the bloodwork I ordered at his clinic visit with me in January as soon as possible      Thanks,  YASMEEN

## 2020-03-09 DIAGNOSIS — I10 ESSENTIAL HYPERTENSION: ICD-10-CM

## 2020-03-09 RX ORDER — LOSARTAN POTASSIUM 25 MG/1
25 TABLET ORAL DAILY
Qty: 30 TABLET | Refills: 0 | Status: SHIPPED | OUTPATIENT
Start: 2020-03-09 | End: 2020-04-23

## 2020-04-23 DIAGNOSIS — I10 ESSENTIAL HYPERTENSION: ICD-10-CM

## 2020-04-23 RX ORDER — LOSARTAN POTASSIUM 25 MG/1
TABLET ORAL
Qty: 90 TABLET | Refills: 1 | Status: SHIPPED | OUTPATIENT
Start: 2020-04-23 | End: 2020-10-22

## 2020-08-18 ENCOUNTER — TELEPHONE (OUTPATIENT)
Dept: FAMILY MEDICINE CLINIC | Facility: CLINIC | Age: 60
End: 2020-08-18

## 2020-08-18 NOTE — LETTER
August 18, 2020     [address]  [address] [facility] [address]      Dear Mr Pachecosey Luis Enrique: In addition to helping you feel better when you are sick, we are interested in preventing illness and injury in the first place  In the spirit of maintaining your good health, our system indicates that you are due for the following:    Yearly Physical Exam     Please call us to make an appointment at your earliest convenience  I look forward to seeing you soon          Sincerely,       Cynthia Richter MD

## 2020-10-22 DIAGNOSIS — I10 ESSENTIAL HYPERTENSION: ICD-10-CM

## 2020-10-22 RX ORDER — LOSARTAN POTASSIUM 25 MG/1
TABLET ORAL
Qty: 30 TABLET | Refills: 0 | Status: SHIPPED | OUTPATIENT
Start: 2020-10-22

## 2020-10-23 DIAGNOSIS — J44.9 COPD, MILD (HCC): ICD-10-CM

## 2020-10-23 RX ORDER — UMECLIDINIUM BROMIDE AND VILANTEROL TRIFENATATE 62.5; 25 UG/1; UG/1
1 POWDER RESPIRATORY (INHALATION) DAILY
Qty: 1 INHALER | Refills: 0 | Status: SHIPPED | OUTPATIENT
Start: 2020-10-23

## 2020-12-04 ENCOUNTER — TELEPHONE (OUTPATIENT)
Dept: FAMILY MEDICINE CLINIC | Facility: CLINIC | Age: 60
End: 2020-12-04

## 2021-04-23 ENCOUNTER — TELEPHONE (OUTPATIENT)
Dept: FAMILY MEDICINE CLINIC | Facility: CLINIC | Age: 61
End: 2021-04-23

## 2021-07-27 ENCOUNTER — OFFICE VISIT (OUTPATIENT)
Dept: CARDIOLOGY CLINIC | Facility: CLINIC | Age: 61
End: 2021-07-27
Payer: COMMERCIAL

## 2021-07-27 VITALS
HEIGHT: 72 IN | HEART RATE: 73 BPM | WEIGHT: 187.2 LBS | RESPIRATION RATE: 16 BRPM | DIASTOLIC BLOOD PRESSURE: 92 MMHG | SYSTOLIC BLOOD PRESSURE: 142 MMHG | BODY MASS INDEX: 25.35 KG/M2

## 2021-07-27 DIAGNOSIS — F17.210 SMOKING GREATER THAN 40 PACK YEARS: ICD-10-CM

## 2021-07-27 DIAGNOSIS — R07.89 ATYPICAL CHEST PAIN: ICD-10-CM

## 2021-07-27 DIAGNOSIS — R94.31 ABNORMAL EKG: ICD-10-CM

## 2021-07-27 DIAGNOSIS — R03.0 ELEVATED BP WITHOUT DIAGNOSIS OF HYPERTENSION: ICD-10-CM

## 2021-07-27 DIAGNOSIS — R00.2 PALPITATIONS: Primary | ICD-10-CM

## 2021-07-27 DIAGNOSIS — I10 ESSENTIAL HYPERTENSION: ICD-10-CM

## 2021-07-27 PROCEDURE — 99244 OFF/OP CNSLTJ NEW/EST MOD 40: CPT

## 2021-07-27 PROCEDURE — 93000 ELECTROCARDIOGRAM COMPLETE: CPT

## 2021-07-27 PROCEDURE — 3008F BODY MASS INDEX DOCD: CPT

## 2021-07-27 PROCEDURE — 4004F PT TOBACCO SCREEN RCVD TLK: CPT

## 2021-07-27 NOTE — PROGRESS NOTES
Cardiology Consultation   MD Brian Sutton MD Spence Mater, DO, [address] [facility] MD Krystin Morales DO, Nuris Armijo DO, [facility] [place]  -------------------------------------------------------------------  [facility]  [address]  [place], [place] [address] [phone]  07/27/21  Phil Bright  YOB: 1960   MRN: [id number]      Referring Physician: Eva Shine MD  [address]  [address]  [place], [place] [address]     HPI:  I am seeing this patient in cardiology consultation for:  Sunshine Stewart is a 62 y o  male with mild COPD, degenerative joint disease/disc disease, smoking greater than 40 pack years who presents today for initial cardiac evaluation  He does have a history in his family of heart disease, father had a myocardial infarction in his 46s  Paternal grandmother had a stroke  He states that he is a 1 pack per day smoker and has been doing so for about 40 years or so  He states as of recently he has been feeling symptoms of palpitations, he mostly notices them at night and they are interfering with his ability to fall asleep  He states that can occur any time however  He does note some atypical sounding chest pain as well the denies shortness of breath  He is relatively active, golfs 4 days per week or so  He he used to be on losartan for blood pressure but he is not taking this anymore  Father MI in 46s  PGM- CVA  Tobacco, smokes pack a day times 40 years  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, for but has been sober now for 18 years  Drugs denies    Review of Systems   Constitutional: Negative for chills and fever  HENT: Negative for facial swelling and sore throat  Eyes: Negative for visual disturbance  Respiratory: Negative for cough, chest tightness, shortness of breath and wheezing  Cardiovascular: Positive for chest pain and palpitations  Negative for leg swelling  Gastrointestinal: Negative for abdominal pain, blood in stool, constipation, diarrhea, nausea and vomiting  Endocrine: Negative for cold intolerance and heat intolerance  Genitourinary: Negative for decreased urine volume, difficulty urinating, dysuria and hematuria  Musculoskeletal: Negative for arthralgias, back pain and myalgias  Skin: Negative for rash  Neurological: Negative for dizziness, syncope, weakness and numbness  Psychiatric/Behavioral: Negative for agitation, behavioral problems and confusion  The patient is not nervous/anxious  OBJECTIVE  Vitals:    07/27/21 0923   BP: 142/92   Pulse: 73   Resp: 16       Physical Exam   Constitutional: awake, alert and oriented, in no acute distress, no obvious deformities  Head: Normocephalic, without obvious abnormality, atraumatic  Eyes: conjunctivae clear and moist  Sclera anicteric  No xanthelasmas  Pupils equal bilaterally  Extraocular motions are full  Ear nose mouth and throat: ears are symmetrical bilaterally, hearing appears to be equal bilaterally, no nasal discharge or epistaxis, oropharynx is clear with moist mucous membranes  Neck:  Trachea is midline, neck is supple, no thyromegaly or significant lymphadenopathy, there is full range of motion  Lungs: clear to auscultation bilaterally, no wheezes, no rales, no rhonchi, no accessory muscle use, breathing is nonlabored  Heart: regular rate and rhythm, S1, S2 normal, 1/6 systolic murmur, no click, no rub and no gallop, no lower extremity edema  Abdomen: soft, non-tender; bowel sounds normal; no masses,  no organomegaly  Psychiatric:  Patient is oriented to time, place, person, mood/affect is negative for depression, anxiety, agitation, appears to have appropriate insight  Skin: Skin is warm, dry, intact  No obvious rashes or lesions on exposed extremities  Nail beds are pink with no cyanosis or clubbing  EKG:  No results found for this visit on 07/27/21       The ASCVD Risk score (Ivone Fofana et al , 2013) failed to calculate for the following reasons:    Cannot find a previous HDL lab    Cannot find a previous total cholesterol lab    IMPRESSION:  1  Palpitations  2  Atypical chest pain  3  Abnormal ECG with possible septal infarct, however may be related to lead placement  4  1/6 cardiac murmur on physical examination  5  Degenerative disc disease  6  Mild COPD  7  Forty pack year smoking history  8  Family history of cardiovascular disease with myocardial infarction in his father in his 46s, also family history of strokes    DISCUSSION/RECOMMENDATIONS:   Given his symptoms as well as risk factors I believe further workup is warranted  His ECG is not totally normal   Does suggest a possible septal infarct, however may be related to lead placement  He does have a murmur on physical examination  Given his symptoms atypical chest pain, palpitations I believe we should do the following   Check 2D echocardiogram to evaluate structure and function of his heart  He also carries a diagnosis of hypertension and was on losartan however stopped this on his own  Blood pressure is a little elevated today but he states that he had a Red Bull  Echo will be to evaluate for structural heart disease, assess for hypertensive changes associated with hypertensive heart disease   Check regular treadmill stress test for non invasive ischemic evaluation given his atypical chest pain, and risk factors for coronary artery disease   Check 48 hour Holter monitor for symptoms of palpitations  He states that he gets symptoms just about every day mostly when he lays down to try to go to sleep  Reasonable likelihood we would catch an arrhythmia with a 48 hour monitor given the frequency of his symptoms     Would continue his current medications at this time with no changes and plan for follow-up after the above 3 tests are performed to review the results make further recommendations based upon the findings    Dominic Briones DO, [facility] [place]  --------------------------------------------------------------------------------  TREADMILL STRESS  No results found for this or any previous visit      ----------------------------------------------------------------------------------------------  NUCLEAR STRESS TEST: No results found for this or any previous visit  No results found for this or any previous visit       --------------------------------------------------------------------------------  CATH:  No results found for this or any previous visit     --------------------------------------------------------------------------------  ECHO:   No results found for this or any previous visit  No results found for this or any previous visit     --------------------------------------------------------------------------------  HOLTER  No results found for this or any previous visit     --------------------------------------------------------------------------------  CAROTIDS  No results found for this or any previous visit  Diagnoses and all orders for this visit:    Palpitations  -     Stress test only, exercise; Future  -     Echo complete with contrast if indicated; Future  -     Holter monitor - 48 hour; Future    Essential hypertension  -     POCT ECG  -     Stress test only, exercise; Future  -     Echo complete with contrast if indicated; Future  -     Holter monitor - 48 hour; Future    Elevated BP without diagnosis of hypertension    Smoking greater than 40 pack years    BMI 28 0-28 9,adult    Atypical chest pain  -     Stress test only, exercise;  Future    Abnormal EKG       ======================================================    Past Medical History:   Diagnosis Date    COPD, mild ([address] ) 11/1/2019    Essential hypertension 11/15/2019    Lumbosacral spondylosis without myelopathy 7/31/2019    Nasal septal perforation      Past Surgical History:   Procedure Laterality Date    SEPTOPLASTY      age 41 - resulted in septal perforation    SINUS SURGERY      age 41 - removal of polyps         Medications  Current Outpatient Medications   Medication Sig Dispense Refill    cyclobenzaprine (FLEXERIL) 10 mg tablet cyclobenzaprine 10 mg tablet   TAKE 1 TABLET BY MOUTH THREE TIMES A DAY AS NEEDED      fluticasone (FLONASE) 50 mcg/act nasal spray SPRAY 2 SPRAYS INTO EACH NOSTRIL EVERY DAY 48 mL 0    sertraline (ZOLOFT) 100 mg tablet sertraline 100 mg tablet   TAKE 1 TABLET BY MOUTH EVERY DAY      tadalafil (CIALIS) 5 MG tablet tadalafil 5 mg tablet   TAKE 1 TABLET BY MOUTH EVERY DAY IF NEEDED      traMADol (ULTRAM) 50 mg tablet tramadol 50 mg tablet   TAKE 1 TO 2 TABLETS BY MOUTH TWICE A DAY AS NEEDED FOR PAIN      zolpidem (AMBIEN) 10 mg tablet zolpidem 10 mg tablet   TAKE 1 TABLET BY MOUTH AT BEDTIME      albuterol (VENTOLIN HFA) 90 mcg/act inhaler Inhale 2 puffs every 6 (six) hours as needed for wheezing (Patient not taking: Reported on 7/27/2021) 1 Inhaler 1    diclofenac sodium (VOLTAREN) 1 % Apply 2 g topically 4 (four) times a day as needed (pain) 100 g 6    losartan (COZAAR) 25 mg tablet TAKE 1 TABLET BY MOUTH EVERY DAY (Patient not taking: Reported on 7/27/2021) 30 tablet 0    umeclidinium-vilanterol (Anoro Ellipta) 62 5-25 MCG/INH inhaler Inhale 1 puff daily (Patient not taking: Reported on 7/27/2021) 1 Inhaler 0     No current facility-administered medications for this visit          Allergies   Allergen Reactions    Shellfish-Derived Products - Food Allergy Hives     Other reaction(s): Respiratory distress    Lisinopril Cough       Social History     Socioeconomic History    Marital status: Unknown     Spouse name: Not on file    Number of children: Not on file    Years of education: Not on file    Highest education level: Not on file   Occupational History    Not on file   Tobacco Use    Smoking status: Current Every Day Smoker     Packs/day: 1 00     Types: Cigarettes    Smokeless tobacco: Current User   Substance and Sexual Activity    Alcohol use: Not Currently    Drug use: Never    Sexual activity: Not Currently     Partners: Female   Other Topics Concern    Not on file   Social History Narrative    Consumes 1 cup of coffee and 2 sodas per day     Social Determinants of Health     Financial Resource Strain:     Difficulty of Paying Living Expenses:    Food Insecurity:     Worried About Running Out of Food in the Last Year:     [address] in the Last Year:    Transportation Needs:     Lack of Transportation (Medical):      Lack of Transportation (Non-Medical):    Physical Activity:     Days of Exercise per Week:     Minutes of Exercise per Session:    Stress:     Feeling of Stress :    Social Connections:     Frequency of Communication with Friends and Family:     Frequency of Social Gatherings with Friends and Family:     Attends [employer] Services:     Active Member of Clubs or Organizations:     Attends Club or Organization Meetings:     Marital Status:    Intimate Partner Violence:     Fear of Current or Ex-Partner:     Emotionally Abused:     Physically Abused:     Sexually Abused:         Family History   Problem Relation Age of Onset    Breast cancer Mother     Heart disease Father     Cancer Father        No results found for: WBC, HGB, HCT, MCV, PLT   No results found for: SODIUM, K, CL, CO2, BUN, CREATININE, GLUC, CALCIUM   Lab Results   Component Value Date    HGBA1C 5 4 11/01/2019      No results found for: CHOL  No results found for: HDL  No results found for: LDLCALC  No results found for: TRIG  No results found for: CHOLHDL   No results found for: INR, PROTIME       Patient Active Problem List    Diagnosis Date Noted    History of gout 03/06/2020    Osteoarthritis 03/06/2020    Recurrent sinusitis 12/16/2019    Cough due to ACE inhibitor 12/16/2019    Essential hypertension 11/15/2019    COPD, mild ([address] ) 11/01/2019    Calculus of gallbladder without cholecystitis without obstruction 11/01/2019    IFG (impaired fasting glucose) 11/01/2019    Elevated BP without diagnosis of hypertension 11/01/2019    Displacement of lumbar intervertebral disc without myelopathy 07/31/2019    Lumbosacral spondylosis without myelopathy 07/31/2019    Cervical disc disease 07/31/2019    Encounter for well adult exam with abnormal findings 07/31/2019    Smoking greater than 40 pack years 07/31/2019    BMI 28 0-28 9,adult 07/31/2019    Multiple joint pain 07/31/2019       Portions of the record may have been created with voice recognition software  Occasional wrong word or "sound a like" substitutions may have occurred due to the inherent limitations of voice recognition software  Read the chart carefully and recognize, using context, where substitutions have occurred      Sam Fish DO, [facility]  7/27/2021 9:55 AM

## 2021-08-30 ENCOUNTER — TELEPHONE (OUTPATIENT)
Dept: NON INVASIVE DIAGNOSTICS | Facility: CLINIC | Age: 61
End: 2021-08-30

## 2021-09-01 ENCOUNTER — HOSPITAL ENCOUNTER (OUTPATIENT)
Dept: NON INVASIVE DIAGNOSTICS | Facility: CLINIC | Age: 61
Discharge: HOME | End: 2021-09-01
Payer: COMMERCIAL

## 2021-09-01 DIAGNOSIS — I10 ESSENTIAL HYPERTENSION: ICD-10-CM

## 2021-09-01 DIAGNOSIS — R07.89 ATYPICAL CHEST PAIN: ICD-10-CM

## 2021-09-01 DIAGNOSIS — R00.2 PALPITATIONS: ICD-10-CM

## 2021-09-01 PROCEDURE — 93306 TTE W/DOPPLER COMPLETE: CPT | Performed by: INTERNAL MEDICINE

## 2021-09-01 PROCEDURE — 93306 TTE W/DOPPLER COMPLETE: CPT

## 2021-09-01 PROCEDURE — 93017 CV STRESS TEST TRACING ONLY: CPT

## 2021-09-01 PROCEDURE — 93018 CV STRESS TEST I&R ONLY: CPT | Performed by: INTERNAL MEDICINE

## 2021-09-02 LAB
CHEST PAIN STATEMENT: NORMAL
MAX DIASTOLIC BP: 100 MMHG
MAX HEART RATE: 171 BPM
MAX PREDICTED HEART RATE: 161 BPM
MAX. SYSTOLIC BP: 204 MMHG
PROTOCOL NAME: NORMAL
REASON FOR TERMINATION: NORMAL
TARGET HR FORMULA: NORMAL
TEST INDICATION: NORMAL
TIME IN EXERCISE PHASE: NORMAL

## 2021-09-14 ENCOUNTER — TELEPHONE (OUTPATIENT)
Dept: CARDIOLOGY CLINIC | Facility: CLINIC | Age: 61
End: 2021-09-14

## 2021-09-14 NOTE — TELEPHONE ENCOUNTER
----- Message from Eduardo Hernandez DO sent at 9/13/2021 10:13 PM EDT -----  Please call the patient regarding his normal result   Study negative for ischemia after maximal exercise

## 2023-07-20 ENCOUNTER — OFFICE VISIT (OUTPATIENT)
Dept: INTERNAL MEDICINE CLINIC | Facility: OTHER | Age: 63
End: 2023-07-20
Payer: COMMERCIAL

## 2023-07-20 VITALS
HEART RATE: 42 BPM | BODY MASS INDEX: 28.2 KG/M2 | OXYGEN SATURATION: 98 % | DIASTOLIC BLOOD PRESSURE: 82 MMHG | WEIGHT: 197 LBS | RESPIRATION RATE: 20 BRPM | SYSTOLIC BLOOD PRESSURE: 150 MMHG | HEIGHT: 70 IN | TEMPERATURE: 98.7 F

## 2023-07-20 DIAGNOSIS — Z12.2 SCREENING FOR LUNG CANCER: ICD-10-CM

## 2023-07-20 DIAGNOSIS — I10 ESSENTIAL HYPERTENSION: ICD-10-CM

## 2023-07-20 DIAGNOSIS — F17.210 SMOKING GREATER THAN 40 PACK YEARS: ICD-10-CM

## 2023-07-20 DIAGNOSIS — Z12.11 SCREENING FOR COLON CANCER: Primary | ICD-10-CM

## 2023-07-20 DIAGNOSIS — Z13.220 SCREENING CHOLESTEROL LEVEL: ICD-10-CM

## 2023-07-20 DIAGNOSIS — Z87.39 HISTORY OF GOUT: ICD-10-CM

## 2023-07-20 DIAGNOSIS — E55.9 VITAMIN D DEFICIENCY: ICD-10-CM

## 2023-07-20 DIAGNOSIS — Z12.5 SCREENING PSA (PROSTATE SPECIFIC ANTIGEN): ICD-10-CM

## 2023-07-20 DIAGNOSIS — F51.01 PRIMARY INSOMNIA: ICD-10-CM

## 2023-07-20 DIAGNOSIS — M48.04 SPINAL STENOSIS OF THORACIC REGION: ICD-10-CM

## 2023-07-20 DIAGNOSIS — M62.838 MUSCLE SPASMS OF NECK: ICD-10-CM

## 2023-07-20 DIAGNOSIS — M48.02 SPINAL STENOSIS OF CERVICAL REGION: ICD-10-CM

## 2023-07-20 DIAGNOSIS — J44.9 COPD, MILD (HCC): ICD-10-CM

## 2023-07-20 PROBLEM — R03.0 ELEVATED BP WITHOUT DIAGNOSIS OF HYPERTENSION: Status: RESOLVED | Noted: 2019-11-01 | Resolved: 2023-07-20

## 2023-07-20 PROBLEM — Z82.49 FAMILY HISTORY OF EARLY CAD: Status: ACTIVE | Noted: 2023-07-20

## 2023-07-20 PROBLEM — J32.9 RECURRENT SINUSITIS: Status: RESOLVED | Noted: 2019-12-16 | Resolved: 2023-07-20

## 2023-07-20 PROBLEM — R73.01 IFG (IMPAIRED FASTING GLUCOSE): Status: RESOLVED | Noted: 2019-11-01 | Resolved: 2023-07-20

## 2023-07-20 PROCEDURE — 99203 OFFICE O/P NEW LOW 30 MIN: CPT | Performed by: FAMILY MEDICINE

## 2023-07-20 RX ORDER — DIPHENOXYLATE HYDROCHLORIDE AND ATROPINE SULFATE 2.5; .025 MG/1; MG/1
1 TABLET ORAL DAILY
COMMUNITY

## 2023-07-20 NOTE — PROGRESS NOTES
Assessment/Plan:    1. Screening for colon cancer  -     James    2. Essential hypertension  -     Comprehensive metabolic panel; Future  -     CBC and differential; Future    3. COPD, mild ([address])  -     CT lung screening program; Future; Expected date: 07/20/2023    4. Screening for lung cancer    5. Spinal stenosis of thoracic region    6. Spinal stenosis of cervical region    7. Smoking greater than 40 pack years  -     CT lung screening program; Future; Expected date: 07/20/2023  -     Vitamin D 25 hydroxy; Future    8. History of gout  -     Uric acid; Future    9. Screening cholesterol level  -     Lipid Panel with Direct LDL reflex; Future    10. Screening PSA (prostate specific antigen)  -     PSA, Total Screen; Future    11. Vitamin D deficiency  -     Vitamin D 25 hydroxy; Future    12. Primary insomnia    13. Muscle spasms of neck      BMI Counseling: Body mass index is 28.07 kg/m². The BMI is above normal. Nutrition recommendations include moderation in carbohydrate intake. Exercise recommendations include exercising 3-5 times per week. No pharmacotherapy was ordered. Rationale for BMI follow-up plan is due to patient being overweight or obese. Depression Screening and Follow-up Plan: Patient was screened for depression during today's encounter. They screened negative with a PHQ-2 score of 0. There are no Patient Instructions on file for this visit. Return in about 6 months (around 1/20/2024) for Recheck. Subjective:      Patient ID: Isabell Hernandez is a 65 y.o. male. Chief Complaint   Patient presents with   • Establish Care   • hm     BMI adult, BMI f/u, Cologuard, PHQ, annual   • Headache     In back on head he gets all the time and he doesn't know why       HPI      Essential hypertension, patient with previous history of hypertension and was started on losartan. He took himself off of this as he felt that it was not helping his blood pressure.   He does check blood pressure at home by his sister-in-law who is an RN and states his systolic is in the 765C. Smoker greater than 40 years, last CT scan of the chest was 2019 which was normal.  He is due for another one but not interested in quitting smoking. Imaging shows mild COPD. Patient denies any shortness of breath or wheezing and is active on the weekends. He was prescribed an inhaler for daily use that he does not take. History of gout, no recent attacks. Spinal stenosis in C-spine and T-spine, following with pain management, has had multiple surgeries. Headaches, located at the back of the head which she can alleviate with massage. No changes in vision or migraine symptoms when this happens but uses Excedrin which helps with his discomfort, patient has had headaches for several years and it is not changing in character, he states he has headaches a few times a week.     The following portions of the patient's history were reviewed and updated as appropriate: allergies, current medications, past family history, past medical history, past social history, past surgical history and problem list.    Review of Systems      Constitutional:  Denies fever or chills   Eyes:  Denies double , blurry vision or eye pain  HENT:  Denies nasal congestion, sore throat or new hearing issues  Respiratory:  Denies cough or shortness of breath or wheezing  Cardiovascular:  Denies palpitations or chest pain  GI:  Denies abdominal pain, nausea, or vomiting, no loose stools, no reflux  Integument:  Denies rash , no open areas  Neurologic:  Denies headache or focal weakness, no dizziness  : no dysuria, or hematuria        Current Outpatient Medications   Medication Sig Dispense Refill   • cyclobenzaprine (FLEXERIL) 10 mg tablet Take 10 mg by mouth if needed     • diclofenac sodium (VOLTAREN) 1 % Apply 2 g topically 4 (four) times a day as needed (pain) 100 g 6   • fluticasone (FLONASE) 50 mcg/act nasal spray SPRAY 2 SPRAYS INTO EACH NOSTRIL EVERY DAY (Patient taking differently: 1 spray into each nostril if needed) 48 mL 0   • multivitamin (THERAGRAN) TABS Take 1 tablet by mouth daily     • sertraline (ZOLOFT) 100 mg tablet sertraline 100 mg tablet   TAKE 1 TABLET BY MOUTH EVERY DAY     • tadalafil (CIALIS) 5 MG tablet Take 5 mg by mouth daily as needed     • traMADol (ULTRAM) 50 mg tablet Take 50 mg by mouth in the morning     • zolpidem (AMBIEN) 10 mg tablet zolpidem 10 mg tablet   TAKE 1 TABLET BY MOUTH AT BEDTIME     • albuterol (VENTOLIN HFA) 90 mcg/act inhaler Inhale 2 puffs every 6 (six) hours as needed for wheezing (Patient not taking: Reported on 7/27/2021) 1 Inhaler 1   • losartan (COZAAR) 25 mg tablet TAKE 1 TABLET BY MOUTH EVERY DAY (Patient not taking: No sig reported) 30 tablet 0   • umeclidinium-vilanterol (Anoro Ellipta) 62.5-25 MCG/INH inhaler Inhale 1 puff daily (Patient not taking: Reported on 7/27/2021) 1 Inhaler 0     No current facility-administered medications for this visit. Objective:    /82 (BP Location: Left arm, Patient Position: Sitting, Cuff Size: Large)   Pulse (!) 42   Temp 98.7 °F (37.1 °C) (Temporal)   Resp 20   Ht 5' 10.25" (1.784 m)   Wt 89.4 kg (197 lb)   SpO2 98%   BMI 28.07 kg/m²        Physical Exam       Constitutional:  Well developed, well nourished, no acute distress, non-toxic appearance   Eyes:  PERRL, conjunctiva normal , non icteric sclera  HENT:  Atraumatic, oropharynx moist. Neck-  supple   Respiratory:  CTA b/l, normal breath sounds, no rales, no wheezing   Cardiovascular:  RRR, no murmurs, no LE edema b/l  GI:  Soft, nondistended, normal bowel sounds x 4, nontender, no organomegaly, no mass, no rebound, no guarding   Neurologic:  no focal deficits noted   Psychiatric:  Speech and behavior appropriate , AAO x 3  Musculoskeletal, tenderness to palpation right sided OA region. Full range of motion in neck.   No radiation  pain      Rad Coffey, DO

## 2023-08-04 ENCOUNTER — HOSPITAL ENCOUNTER (OUTPATIENT)
Dept: RADIOLOGY | Age: 63
Discharge: HOME | End: 2023-08-04
Payer: COMMERCIAL

## 2023-08-04 DIAGNOSIS — F17.210 SMOKING GREATER THAN 40 PACK YEARS: ICD-10-CM

## 2023-08-04 DIAGNOSIS — J44.9 COPD, MILD (HCC): ICD-10-CM

## 2023-08-04 LAB
25(OH)D3+25(OH)D2 SERPL-MCNC: 78.6 NG/ML (ref 30–100)
ALBUMIN SERPL-MCNC: 4.6 G/DL (ref 3.9–4.9)
ALBUMIN/GLOB SERPL: 2.2 {RATIO} (ref 1.2–2.2)
ALP SERPL-CCNC: 82 IU/L (ref 44–121)
ALT SERPL-CCNC: 25 IU/L (ref 0–44)
AST SERPL-CCNC: 21 IU/L (ref 0–40)
BASOPHILS # BLD AUTO: 0.1 X10E3/UL (ref 0–0.2)
BASOPHILS NFR BLD AUTO: 1 %
BILIRUB SERPL-MCNC: 0.4 MG/DL (ref 0–1.2)
BUN SERPL-MCNC: 17 MG/DL (ref 8–27)
BUN/CREAT SERPL: 17 (ref 10–24)
CALCIUM SERPL-MCNC: 10.3 MG/DL (ref 8.6–10.2)
CHLORIDE SERPL-SCNC: 105 MMOL/L (ref 96–106)
CHOLEST SERPL-MCNC: 164 MG/DL (ref 100–199)
CO2 SERPL-SCNC: 20 MMOL/L (ref 20–29)
CREAT SERPL-MCNC: 0.99 MG/DL (ref 0.76–1.27)
EGFR: 87 ML/MIN/1.73
EOSINOPHIL # BLD AUTO: 0.2 X10E3/UL (ref 0–0.4)
EOSINOPHIL NFR BLD AUTO: 2 %
ERYTHROCYTE [DISTWIDTH] IN BLOOD BY AUTOMATED COUNT: 13 % (ref 11.6–15.4)
GLOBULIN SER-MCNC: 2.1 G/DL (ref 1.5–4.5)
GLUCOSE SERPL-MCNC: 93 MG/DL (ref 70–99)
HCT VFR BLD AUTO: 46.3 % (ref 37.5–51)
HDLC SERPL-MCNC: 44 MG/DL
HGB BLD-MCNC: 16.3 G/DL (ref 13–17.7)
IMM GRANULOCYTES # BLD: 0 X10E3/UL (ref 0–0.1)
IMM GRANULOCYTES NFR BLD: 0 %
LDLC SERPL CALC-MCNC: 104 MG/DL (ref 0–99)
LYMPHOCYTES # BLD AUTO: 2.8 X10E3/UL (ref 0.7–3.1)
LYMPHOCYTES NFR BLD AUTO: 30 %
MCH RBC QN AUTO: 33.5 PG (ref 26.6–33)
MCHC RBC AUTO-ENTMCNC: 35.2 G/DL (ref 31.5–35.7)
MCV RBC AUTO: 95 FL (ref 79–97)
MONOCYTES # BLD AUTO: 0.8 X10E3/UL (ref 0.1–0.9)
MONOCYTES NFR BLD AUTO: 8 %
NEUTROPHILS # BLD AUTO: 5.4 X10E3/UL (ref 1.4–7)
NEUTROPHILS NFR BLD AUTO: 59 %
PLATELET # BLD AUTO: 184 X10E3/UL (ref 150–450)
POTASSIUM SERPL-SCNC: 4.4 MMOL/L (ref 3.5–5.2)
PROT SERPL-MCNC: 6.7 G/DL (ref 6–8.5)
PSA SERPL-MCNC: 2 NG/ML (ref 0–4)
RBC # BLD AUTO: 4.87 X10E6/UL (ref 4.14–5.8)
SODIUM SERPL-SCNC: 142 MMOL/L (ref 134–144)
TRIGL SERPL-MCNC: 82 MG/DL (ref 0–149)
URATE SERPL-MCNC: 6.1 MG/DL (ref 3.8–8.4)
WBC # BLD AUTO: 9.3 X10E3/UL (ref 3.4–10.8)

## 2023-08-04 PROCEDURE — 71271 CT THORAX LUNG CANCER SCR C-: CPT

## 2023-08-10 LAB — COLOGUARD RESULT REPORTABLE: NEGATIVE

## 2023-08-11 ENCOUNTER — TELEPHONE (OUTPATIENT)
Age: 63
End: 2023-08-11

## 2023-08-11 DIAGNOSIS — F17.210 SMOKING GREATER THAN 40 PACK YEARS: ICD-10-CM

## 2023-08-11 DIAGNOSIS — J98.4 LUNG CYST: ICD-10-CM

## 2023-08-11 DIAGNOSIS — R91.8 PULMONARY NODULES: Primary | ICD-10-CM

## 2023-08-11 NOTE — TELEPHONE ENCOUNTER
Hi Dr. Tracy Landeros,    [facility] ([facility]) Radiology called with significant findings in patient's CT lung screening. Please address.

## 2023-08-11 NOTE — TELEPHONE ENCOUNTER
Called patient, no answer, left message on machine indicating further need for PET scan. Test is ordered in chart.   Left phone number for patient to call

## 2023-08-30 ENCOUNTER — HOSPITAL ENCOUNTER (OUTPATIENT)
Dept: RADIOLOGY | Age: 63
Discharge: HOME | End: 2023-08-30
Payer: COMMERCIAL

## 2023-08-30 ENCOUNTER — NURSE TRIAGE (OUTPATIENT)
Age: 63
End: 2023-08-30

## 2023-08-30 ENCOUNTER — TELEPHONE (OUTPATIENT)
Dept: INTERNAL MEDICINE CLINIC | Facility: OTHER | Age: 63
End: 2023-08-30

## 2023-08-30 DIAGNOSIS — R94.8 ABNORMAL POSITRON EMISSION TOMOGRAPHY (PET) SCAN: Primary | ICD-10-CM

## 2023-08-30 DIAGNOSIS — F17.210 SMOKING GREATER THAN 40 PACK YEARS: ICD-10-CM

## 2023-08-30 DIAGNOSIS — J98.4 LUNG CYST: ICD-10-CM

## 2023-08-30 DIAGNOSIS — R91.8 PULMONARY NODULES: ICD-10-CM

## 2023-08-30 LAB — GLUCOSE SERPL-MCNC: 95 MG/DL (ref 65–140)

## 2023-08-30 PROCEDURE — 78815 PET IMAGE W/CT SKULL-THIGH: CPT

## 2023-08-30 PROCEDURE — A9552 F18 FDG: HCPCS

## 2023-08-30 PROCEDURE — G1004 CDSM NDSC: HCPCS

## 2023-08-30 PROCEDURE — 82948 REAGENT STRIP/BLOOD GLUCOSE: CPT

## 2023-08-30 NOTE — TELEPHONE ENCOUNTER
Called and left message on machine for patient to call back to review results, recommend referral for urology due to abnormal findings on PET scan. Additional findings can be addressed by primary care provider when return to office.

## 2023-08-30 NOTE — TELEPHONE ENCOUNTER
Patient has returned call, provided phone number for urology, informed him further findings will be discussed once pcp is back in the office.

## 2023-08-30 NOTE — TELEPHONE ENCOUNTER
Received call from [facility] with immediate findings of NM PET CT skull base to mid thigh, resulted on 08/30/2023. Please advise, ordering provider out of office.

## 2023-08-30 NOTE — TELEPHONE ENCOUNTER
Called and LM for patient that first availability in [facility] was 9/27 at 2:45. Informed he is in that spot and is to call back to change or cancel appt.

## 2023-08-30 NOTE — TELEPHONE ENCOUNTER
Please assist in scheduling for TCC of bladder. Regarding: NP, Abnormal PET scan  ----- Message from [employer] sent at 8/30/2023  2:11 PM EDT -----  Patient calling to schedule a NP appt for abnormal PET scan results. IMPRESSION:     1. The cystic focus in the left lower lobe demonstrates no abnormal FDG activity, suggesting a benign finding. Recommend continuation of noncontrast low-dose CT chest follow-up in 1 year. 2. 3.3 x 2.5 cm soft tissue filling defect in the right posterolateral bladder highly concerning for transitional cell carcinoma. Urology consultation is recommended  3. Mild soft tissue and FDG asymmetry, left tongue base. Direct visualization is recommended. 4. Left upper back soft tissue thickening with mildly increased FDG activity likely inflammatory, correlate clinically  5.  Cholelithiasis    Patient requesting a call back at [phone]

## 2023-08-30 NOTE — TELEPHONE ENCOUNTER
Pt called and he is not sure why he was called for this appointment. He doesn't know why he needs this appointment and it is for.      Pt can be reached at [phone]

## 2023-08-31 NOTE — TELEPHONE ENCOUNTER
Left a voicemail per communication sheet to have patient call back regarding questions he has about scheduled appointment with Dr. Nick May.

## 2023-09-27 ENCOUNTER — OFFICE VISIT (OUTPATIENT)
Dept: UROLOGY | Facility: AMBULATORY SURGERY CENTER | Age: 63
End: 2023-09-27
Payer: COMMERCIAL

## 2023-09-27 ENCOUNTER — PREP FOR PROCEDURE (OUTPATIENT)
Dept: UROLOGY | Facility: AMBULATORY SURGERY CENTER | Age: 63
End: 2023-09-27

## 2023-09-27 VITALS
DIASTOLIC BLOOD PRESSURE: 78 MMHG | HEART RATE: 60 BPM | SYSTOLIC BLOOD PRESSURE: 126 MMHG | HEIGHT: 70 IN | RESPIRATION RATE: 18 BRPM | OXYGEN SATURATION: 96 % | BODY MASS INDEX: 28.2 KG/M2 | WEIGHT: 197 LBS

## 2023-09-27 DIAGNOSIS — C67.2 MALIGNANT NEOPLASM OF LATERAL WALL OF URINARY BLADDER (HCC): Primary | ICD-10-CM

## 2023-09-27 DIAGNOSIS — R39.89 SUSPECTED UTI: ICD-10-CM

## 2023-09-27 DIAGNOSIS — Z01.810 PREOP CARDIOVASCULAR EXAM: ICD-10-CM

## 2023-09-27 PROCEDURE — 99205 OFFICE O/P NEW HI 60 MIN: CPT | Performed by: UROLOGY

## 2023-09-27 RX ORDER — CEFAZOLIN SODIUM 2 G/50ML
2000 SOLUTION INTRAVENOUS ONCE
Status: CANCELLED | OUTPATIENT
Start: 2023-10-04 | End: 2023-09-27

## 2023-09-27 RX ORDER — CLOBETASOL PROPIONATE 0.5 MG/G
CREAM TOPICAL
COMMUNITY

## 2023-09-27 RX ORDER — SODIUM CHLORIDE 9 MG/ML
125 INJECTION, SOLUTION INTRAVENOUS CONTINUOUS
Status: CANCELLED | OUTPATIENT
Start: 2023-10-04

## 2023-09-27 NOTE — PROGRESS NOTES
9/27/2023    Yonatan Hunter  1960  [id number]        Assessment  CT PET scan with soft tissue filling defect in the bladder highly suspicious for urothelial carcinoma, hematuria, heavy smoking history      Discussion  I had a lengthy discussion with Sahra Yo in the office today. I am concerned that with his hematuria and 45+ pack years of smoking along with the PET scan findings that he has a bladder tumor located along the right lateral base of the bladder. We discussed the significance of urothelial carcinoma and the connection to cigarette smoking. I recommend cystoscopy with transurethral resection of the bladder tumor along with intravesical instillation of mitomycin. Risk of the procedure including, but not limited to, bleeding, infection, perforation, disease recurrence, and need for additional surgery were discussed and reviewed. Informed consent was obtained. History of Present Illness  65 y.o. male with a history of a benign lung lesion. He has a chronic smoking history of 45+ pack years. The lung lesion was further evaluated with a CT PET scan. On the PET scan there was no sign of malignancy in the chest, however, a filling defect located along the right lateral wall of the bladder was appreciated. He reports 1-2 episodes recently of painless gross hematuria. He is referred to urology based on the CT PET findings. He denies any prior history of urothelial carcinoma. A recent PSA level is 2.0. He denies any family history of prostate cancer. He states that he is otherwise healthy other than mild hypertension. He takes no blood thinners.         AUA Symptom Score  AUA SYMPTOM SCORE    Flowsheet Row Most Recent Value   AUA SYMPTOM SCORE    How often have you had a sensation of not emptying your bladder completely after you finished urinating? 0 (P)     How often have you had to urinate again less than two hours after you finished urinating? 3 (P)     How often have you found you stopped and started again several times when you urinate? 0 (P)     How often have you found it difficult to postpone urination? 0 (P)     How often have you had a weak urinary stream? 0 (P)     How often have you had to push or strain to begin urination? 0 (P)     How many times did you most typically get up to urinate from the time you went to bed at night until the time you got up in the morning? 0 (P)     Quality of Life: If you were to spend the rest of your life with your urinary condition just the way it is now, how would you feel about that? 1 (P)     AUA SYMPTOM SCORE 3 (P)           Review of Systems  Review of Systems   Constitutional: Negative. HENT: Negative. Eyes: Negative. Respiratory: Negative. Cardiovascular: Negative. Gastrointestinal: Negative. Endocrine: Negative. Genitourinary:        Per HPI   Musculoskeletal: Negative. Skin: Negative. Allergic/Immunologic: Negative. Neurological: Negative. Hematological: Negative. Psychiatric/Behavioral: Negative.           Past Medical History  Past Medical History:   Diagnosis Date   • COPD, mild ([address]) 11/1/2019   • Elevated BP without diagnosis of hypertension 11/1/2019   • Essential hypertension 11/15/2019   • IFG (impaired fasting glucose) 11/1/2019   • Lumbosacral spondylosis without myelopathy 7/31/2019   • Nasal septal perforation    • Recurrent sinusitis 12/16/2019       Past Social History  Past Surgical History:   Procedure Laterality Date   • SEPTOPLASTY      age 41 - resulted in septal perforation   • SINUS SURGERY      age 41 - removal of polyps       Past Family History  Family History   Problem Relation Age of Onset   • Breast cancer Mother    • Heart disease Father    • Cancer Father        Past Social history  Social History     Socioeconomic History   • Marital status: Unknown     Spouse name: Not on file   • Number of children: Not on file   • Years of education: Not on file   • Highest education level: Not on file   Occupational History   • Not on file   Tobacco Use   • Smoking status: Every Day     Packs/day: 1.00     Years: 40.00     Total pack years: 40.00     Types: Cigarettes   • Smokeless tobacco: Current   Vaping Use   • Vaping Use: Never used   Substance and Sexual Activity   • Alcohol use: Not Currently   • Drug use: Never   • Sexual activity: Not Currently     Partners: Female   Other Topics Concern   • Not on file   Social History Narrative    Consumes 1 cup of coffee and 2 sodas per day     Social Determinants of Health     Financial Resource Strain: Low Risk  (7/31/2019)    Overall Financial Resource Strain (CARDIA)    • Difficulty of Paying Living Expenses: Not hard at all   Food Insecurity: No Food Insecurity (7/31/2019)    Hunger Vital Sign    • Worried About Running Out of Food in the Last Year: Never true    • Ran Out of Food in the Last Year: Never true   Transportation Needs: No Transportation Needs (7/31/2019)    PRAPARE - Transportation    • Lack of Transportation (Medical): No    • Lack of Transportation (Non-Medical): No   Physical Activity: Inactive (7/31/2019)    Exercise Vital Sign    • Days of Exercise per Week: 0 days    • Minutes of Exercise per Session: 0 min   Stress: No Stress Concern Present (7/31/2019)    [facility]    • Feeling of Stress : Not at all   Social Connections:  Moderately Isolated (7/31/2019)    Social Connection and Isolation Panel [NHANES]    • Frequency of Communication with Friends and Family: More than three times a week    • Frequency of Social Gatherings with Friends and Family: More than three times a week    • Attends [employer] Services: Never    • Active Member of Clubs or Organizations: No    • Attends Club or Organization Meetings: Never    • Marital Status:    Intimate Partner Violence: Not At Risk (7/31/2019)    Humiliation, Afraid, Rape, and Kick questionnaire    • Fear of Current or Ex-Partner: No    • Emotionally Abused: No    • Physically Abused: No    • Sexually Abused: No   Housing Stability: Not on file       Current Medications  Current Outpatient Medications   Medication Sig Dispense Refill   • clobetasol (TEMOVATE) 0.05 % cream APPLY SPARINGLY TWICE DAILY TO RASH UP TO 2 WEEKS     • cyclobenzaprine (FLEXERIL) 10 mg tablet Take 10 mg by mouth if needed     • fluticasone (FLONASE) 50 mcg/act nasal spray SPRAY 2 SPRAYS INTO EACH NOSTRIL EVERY DAY (Patient taking differently: 1 spray into each nostril if needed) 48 mL 0   • multivitamin (THERAGRAN) TABS Take 1 tablet by mouth daily     • sertraline (ZOLOFT) 100 mg tablet sertraline 100 mg tablet   TAKE 1 TABLET BY MOUTH EVERY DAY     • tadalafil (CIALIS) 5 MG tablet Take 5 mg by mouth daily as needed     • traMADol (ULTRAM) 50 mg tablet Take 50 mg by mouth in the morning     • zolpidem (AMBIEN) 10 mg tablet zolpidem 10 mg tablet   TAKE 1 TABLET BY MOUTH AT BEDTIME     • albuterol (VENTOLIN HFA) 90 mcg/act inhaler Inhale 2 puffs every 6 (six) hours as needed for wheezing (Patient not taking: Reported on 7/27/2021) 1 Inhaler 1   • diclofenac sodium (VOLTAREN) 1 % Apply 2 g topically 4 (four) times a day as needed (pain) 100 g 6   • losartan (COZAAR) 25 mg tablet TAKE 1 TABLET BY MOUTH EVERY DAY (Patient not taking: Reported on 9/27/2023) 30 tablet 0   • umeclidinium-vilanterol (Anoro Ellipta) 62.5-25 MCG/INH inhaler Inhale 1 puff daily (Patient not taking: Reported on 7/27/2021) 1 Inhaler 0     No current facility-administered medications for this visit. Allergies  Allergies   Allergen Reactions   • Shellfish-Derived Products - Food Allergy Hives     Other reaction(s): Respiratory distress   • Lisinopril Cough       Past Medical History, Social History, Family History, medications and allergies were reviewed.     Vitals  Vitals:    09/27/23 1427   BP: 126/78   BP Location: Left arm   Patient Position: Sitting   Cuff Size: Standard Pulse: 60   Resp: 18   SpO2: 96%   Weight: 89.4 kg (197 lb)   Height: 5' 10" (1.778 m)       Physical Exam  Physical Exam    Semination he is in no acute distress. Respiratory no distress. Cardiac is regular. Abdomen is soft nontender nondistended.  examination reveals no CVA tenderness. Skin is warm. Extremities without edema.   Neurologic is grossly intact and nonfocal.  Gait normal.  Affect normal  Results  Lab Results   Component Value Date    PSA 2.0 08/03/2023     Lab Results   Component Value Date    K 4.4 08/03/2023    CO2 20 08/03/2023     08/03/2023    BUN 17 08/03/2023    CREATININE 0.99 08/03/2023     Lab Results   Component Value Date    WBC 9.3 08/03/2023    HGB 16.3 08/03/2023    HCT 46.3 08/03/2023    MCV 95 08/03/2023     08/03/2023         Office Urine Dip  No results found for this or any previous visit (from the past 1 hour(s)).]

## 2023-09-27 NOTE — LETTER
September 27, 2023     Monika Valenzuela, [address]    Patient: Diego Mata   YOB: 1960   Date of Visit: 9/27/2023       Dear Dr. Chava Knapp: Thank you for referring Ella Price to me for evaluation. Below are my notes for this consultation. If you have questions, please do not hesitate to call me. I look forward to following your patient along with you. Sincerely,        Matthew Willis MD        CC: No Recipients    Matthew Willis MD  9/27/2023  3:19 PM  Sign when Signing Visit  9/27/2023    Diego Mata  1960  [id number]        Assessment  CT PET scan with soft tissue filling defect in the bladder highly suspicious for urothelial carcinoma, hematuria, heavy smoking history      Discussion  I had a lengthy discussion with Yadiel Aguilar in the office today. I am concerned that with his hematuria and 45+ pack years of smoking along with the PET scan findings that he has a bladder tumor located along the right lateral base of the bladder. We discussed the significance of urothelial carcinoma and the connection to cigarette smoking. I recommend cystoscopy with transurethral resection of the bladder tumor along with intravesical instillation of mitomycin. Risk of the procedure including, but not limited to, bleeding, infection, perforation, disease recurrence, and need for additional surgery were discussed and reviewed. Informed consent was obtained. History of Present Illness  65 y.o. male with a history of a benign lung lesion. He has a chronic smoking history of 45+ pack years. The lung lesion was further evaluated with a CT PET scan. On the PET scan there was no sign of malignancy in the chest, however, a filling defect located along the right lateral wall of the bladder was appreciated. He reports 1-2 episodes recently of painless gross hematuria. He is referred to urology based on the CT PET findings.   He denies any prior history of urothelial carcinoma. A recent PSA level is 2.0. He denies any family history of prostate cancer. He states that he is otherwise healthy other than mild hypertension. He takes no blood thinners. AUA Symptom Score  AUA SYMPTOM SCORE      Flowsheet Row Most Recent Value   AUA SYMPTOM SCORE    How often have you had a sensation of not emptying your bladder completely after you finished urinating? 0 (P)     How often have you had to urinate again less than two hours after you finished urinating? 3 (P)     How often have you found you stopped and started again several times when you urinate? 0 (P)     How often have you found it difficult to postpone urination? 0 (P)     How often have you had a weak urinary stream? 0 (P)     How often have you had to push or strain to begin urination? 0 (P)     How many times did you most typically get up to urinate from the time you went to bed at night until the time you got up in the morning? 0 (P)     Quality of Life: If you were to spend the rest of your life with your urinary condition just the way it is now, how would you feel about that? 1 (P)     AUA SYMPTOM SCORE 3 (P)             Review of Systems  Review of Systems   Constitutional: Negative. HENT: Negative. Eyes: Negative. Respiratory: Negative. Cardiovascular: Negative. Gastrointestinal: Negative. Endocrine: Negative. Genitourinary:        Per HPI   Musculoskeletal: Negative. Skin: Negative. Allergic/Immunologic: Negative. Neurological: Negative. Hematological: Negative. Psychiatric/Behavioral: Negative.           Past Medical History  Past Medical History:   Diagnosis Date   • COPD, mild ([address]) 11/1/2019   • Elevated BP without diagnosis of hypertension 11/1/2019   • Essential hypertension 11/15/2019   • IFG (impaired fasting glucose) 11/1/2019   • Lumbosacral spondylosis without myelopathy 7/31/2019   • Nasal septal perforation    • Recurrent sinusitis 12/16/2019 Past Social History  Past Surgical History:   Procedure Laterality Date   • SEPTOPLASTY      age 41 - resulted in septal perforation   • SINUS SURGERY      age 41 - removal of polyps       Past Family History  Family History   Problem Relation Age of Onset   • Breast cancer Mother    • Heart disease Father    • Cancer Father        Past Social history  Social History     Socioeconomic History   • Marital status: Unknown     Spouse name: Not on file   • Number of children: Not on file   • Years of education: Not on file   • Highest education level: Not on file   Occupational History   • Not on file   Tobacco Use   • Smoking status: Every Day     Packs/day: 1.00     Years: 40.00     Total pack years: 40.00     Types: Cigarettes   • Smokeless tobacco: Current   Vaping Use   • Vaping Use: Never used   Substance and Sexual Activity   • Alcohol use: Not Currently   • Drug use: Never   • Sexual activity: Not Currently     Partners: Female   Other Topics Concern   • Not on file   Social History Narrative    Consumes 1 cup of coffee and 2 sodas per day     Social Determinants of Health     Financial Resource Strain: Low Risk  (7/31/2019)    Overall Financial Resource Strain (CARDIA)    • Difficulty of Paying Living Expenses: Not hard at all   Food Insecurity: No Food Insecurity (7/31/2019)    Hunger Vital Sign    • Worried About Running Out of Food in the Last Year: Never true    • Ran Out of Food in the Last Year: Never true   Transportation Needs: No Transportation Needs (7/31/2019)    PRAPARE - Transportation    • Lack of Transportation (Medical): No    • Lack of Transportation (Non-Medical):  No   Physical Activity: Inactive (7/31/2019)    Exercise Vital Sign    • Days of Exercise per Week: 0 days    • Minutes of Exercise per Session: 0 min   Stress: No Stress Concern Present (7/31/2019)    [address] [place] [address]    • Feeling of Stress : Not at all   Social Connections:  Moderately Isolated (7/31/2019)    Social Connection and Isolation Panel [NHANES]    • Frequency of Communication with Friends and Family: More than three times a week    • Frequency of Social Gatherings with Friends and Family: More than three times a week    • Attends Yazidism Services: Never    • Active Member of Clubs or Organizations: No    • Attends Club or Organization Meetings: Never    • Marital Status:    Intimate Partner Violence: Not At Risk (7/31/2019)    Humiliation, Afraid, Rape, and Kick questionnaire    • Fear of Current or Ex-Partner: No    • Emotionally Abused: No    • Physically Abused: No    • Sexually Abused: No   Housing Stability: Not on file       Current Medications  Current Outpatient Medications   Medication Sig Dispense Refill   • clobetasol (TEMOVATE) 0.05 % cream APPLY SPARINGLY TWICE DAILY TO RASH UP TO 2 WEEKS     • cyclobenzaprine (FLEXERIL) 10 mg tablet Take 10 mg by mouth if needed     • fluticasone (FLONASE) 50 mcg/act nasal spray SPRAY 2 SPRAYS INTO EACH NOSTRIL EVERY DAY (Patient taking differently: 1 spray into each nostril if needed) 48 mL 0   • multivitamin (THERAGRAN) TABS Take 1 tablet by mouth daily     • sertraline (ZOLOFT) 100 mg tablet sertraline 100 mg tablet   TAKE 1 TABLET BY MOUTH EVERY DAY     • tadalafil (CIALIS) 5 MG tablet Take 5 mg by mouth daily as needed     • traMADol (ULTRAM) 50 mg tablet Take 50 mg by mouth in the morning     • zolpidem (AMBIEN) 10 mg tablet zolpidem 10 mg tablet   TAKE 1 TABLET BY MOUTH AT BEDTIME     • albuterol (VENTOLIN HFA) 90 mcg/act inhaler Inhale 2 puffs every 6 (six) hours as needed for wheezing (Patient not taking: Reported on 7/27/2021) 1 Inhaler 1   • diclofenac sodium (VOLTAREN) 1 % Apply 2 g topically 4 (four) times a day as needed (pain) 100 g 6   • losartan (COZAAR) 25 mg tablet TAKE 1 TABLET BY MOUTH EVERY DAY (Patient not taking: Reported on 9/27/2023) 30 tablet 0   • umeclidinium-vilanterol (Anoro Ellipta) 62.5-25 MCG/INH inhaler Inhale 1 puff daily (Patient not taking: Reported on 7/27/2021) 1 Inhaler 0     No current facility-administered medications for this visit. Allergies  Allergies   Allergen Reactions   • Shellfish-Derived Products - Food Allergy Hives     Other reaction(s): Respiratory distress   • Lisinopril Cough       Past Medical History, Social History, Family History, medications and allergies were reviewed. Vitals  Vitals:    09/27/23 1427   BP: 126/78   BP Location: Left arm   Patient Position: Sitting   Cuff Size: Standard   Pulse: 60   Resp: 18   SpO2: 96%   Weight: 89.4 kg (197 lb)   Height: 5' 10" (1.778 m)       Physical Exam  Physical Exam    Semination he is in no acute distress. Respiratory no distress. Cardiac is regular. Abdomen is soft nontender nondistended.  examination reveals no CVA tenderness. Skin is warm. Extremities without edema.   Neurologic is grossly intact and nonfocal.  Gait normal.  Affect normal  Results  Lab Results   Component Value Date    PSA 2.0 08/03/2023     Lab Results   Component Value Date    K 4.4 08/03/2023    CO2 20 08/03/2023     08/03/2023    BUN 17 08/03/2023    CREATININE 0.99 08/03/2023     Lab Results   Component Value Date    WBC 9.3 08/03/2023    HGB 16.3 08/03/2023    HCT 46.3 08/03/2023    MCV 95 08/03/2023     08/03/2023         Office Urine Dip  No results found for this or any previous visit (from the past 1 hour(s)).]

## 2023-09-27 NOTE — H&P (VIEW-ONLY)
9/27/2023    Kendal Vilchis  1960  [id number]        Assessment  CT PET scan with soft tissue filling defect in the bladder highly suspicious for urothelial carcinoma, hematuria, heavy smoking history      Discussion  I had a lengthy discussion with Nati Landry in the office today. I am concerned that with his hematuria and 45+ pack years of smoking along with the PET scan findings that he has a bladder tumor located along the right lateral base of the bladder. We discussed the significance of urothelial carcinoma and the connection to cigarette smoking. I recommend cystoscopy with transurethral resection of the bladder tumor along with intravesical instillation of mitomycin. Risk of the procedure including, but not limited to, bleeding, infection, perforation, disease recurrence, and need for additional surgery were discussed and reviewed. Informed consent was obtained. History of Present Illness  65 y.o. male with a history of a benign lung lesion. He has a chronic smoking history of 45+ pack years. The lung lesion was further evaluated with a CT PET scan. On the PET scan there was no sign of malignancy in the chest, however, a filling defect located along the right lateral wall of the bladder was appreciated. He reports 1-2 episodes recently of painless gross hematuria. He is referred to urology based on the CT PET findings. He denies any prior history of urothelial carcinoma. A recent PSA level is 2.0. He denies any family history of prostate cancer. He states that he is otherwise healthy other than mild hypertension. He takes no blood thinners.         AUA Symptom Score  AUA SYMPTOM SCORE    Flowsheet Row Most Recent Value   AUA SYMPTOM SCORE    How often have you had a sensation of not emptying your bladder completely after you finished urinating? 0 (P)     How often have you had to urinate again less than two hours after you finished urinating? 3 (P)     How often have you found you stopped and started again several times when you urinate? 0 (P)     How often have you found it difficult to postpone urination? 0 (P)     How often have you had a weak urinary stream? 0 (P)     How often have you had to push or strain to begin urination? 0 (P)     How many times did you most typically get up to urinate from the time you went to bed at night until the time you got up in the morning? 0 (P)     Quality of Life: If you were to spend the rest of your life with your urinary condition just the way it is now, how would you feel about that? 1 (P)     AUA SYMPTOM SCORE 3 (P)           Review of Systems  Review of Systems   Constitutional: Negative. HENT: Negative. Eyes: Negative. Respiratory: Negative. Cardiovascular: Negative. Gastrointestinal: Negative. Endocrine: Negative. Genitourinary:        Per HPI   Musculoskeletal: Negative. Skin: Negative. Allergic/Immunologic: Negative. Neurological: Negative. Hematological: Negative. Psychiatric/Behavioral: Negative.           Past Medical History  Past Medical History:   Diagnosis Date   • COPD, mild ([address]) 11/1/2019   • Elevated BP without diagnosis of hypertension 11/1/2019   • Essential hypertension 11/15/2019   • IFG (impaired fasting glucose) 11/1/2019   • Lumbosacral spondylosis without myelopathy 7/31/2019   • Nasal septal perforation    • Recurrent sinusitis 12/16/2019       Past Social History  Past Surgical History:   Procedure Laterality Date   • SEPTOPLASTY      age 41 - resulted in septal perforation   • SINUS SURGERY      age 41 - removal of polyps       Past Family History  Family History   Problem Relation Age of Onset   • Breast cancer Mother    • Heart disease Father    • Cancer Father        Past Social history  Social History     Socioeconomic History   • Marital status: Unknown     Spouse name: Not on file   • Number of children: Not on file   • Years of education: Not on file   • Highest education level: Not on file   Occupational History   • Not on file   Tobacco Use   • Smoking status: Every Day     Packs/day: 1.00     Years: 40.00     Total pack years: 40.00     Types: Cigarettes   • Smokeless tobacco: Current   Vaping Use   • Vaping Use: Never used   Substance and Sexual Activity   • Alcohol use: Not Currently   • Drug use: Never   • Sexual activity: Not Currently     Partners: Female   Other Topics Concern   • Not on file   Social History Narrative    Consumes 1 cup of coffee and 2 sodas per day     Social Determinants of Health     Financial Resource Strain: Low Risk  (7/31/2019)    Overall Financial Resource Strain (CARDIA)    • Difficulty of Paying Living Expenses: Not hard at all   Food Insecurity: No Food Insecurity (7/31/2019)    Hunger Vital Sign    • Worried About Running Out of Food in the Last Year: Never true    • Ran Out of Food in the Last Year: Never true   Transportation Needs: No Transportation Needs (7/31/2019)    PRAPARE - Transportation    • Lack of Transportation (Medical): No    • Lack of Transportation (Non-Medical): No   Physical Activity: Inactive (7/31/2019)    Exercise Vital Sign    • Days of Exercise per Week: 0 days    • Minutes of Exercise per Session: 0 min   Stress: No Stress Concern Present (7/31/2019)    [address] [facility]    • Feeling of Stress : Not at all   Social Connections:  Moderately Isolated (7/31/2019)    Social Connection and Isolation Panel [NHANES]    • Frequency of Communication with Friends and Family: More than three times a week    • Frequency of Social Gatherings with Friends and Family: More than three times a week    • Attends Rastafarian Services: Never    • Active Member of Clubs or Organizations: No    • Attends Club or Organization Meetings: Never    • Marital Status:    Intimate Partner Violence: Not At Risk (7/31/2019)    Humiliation, Afraid, Rape, and Kick questionnaire    • Fear of Current or Ex-Partner: No    • Emotionally Abused: No    • Physically Abused: No    • Sexually Abused: No   Housing Stability: Not on file       Current Medications  Current Outpatient Medications   Medication Sig Dispense Refill   • clobetasol (TEMOVATE) 0.05 % cream APPLY SPARINGLY TWICE DAILY TO RASH UP TO 2 WEEKS     • cyclobenzaprine (FLEXERIL) 10 mg tablet Take 10 mg by mouth if needed     • fluticasone (FLONASE) 50 mcg/act nasal spray SPRAY 2 SPRAYS INTO EACH NOSTRIL EVERY DAY (Patient taking differently: 1 spray into each nostril if needed) 48 mL 0   • multivitamin (THERAGRAN) TABS Take 1 tablet by mouth daily     • sertraline (ZOLOFT) 100 mg tablet sertraline 100 mg tablet   TAKE 1 TABLET BY MOUTH EVERY DAY     • tadalafil (CIALIS) 5 MG tablet Take 5 mg by mouth daily as needed     • traMADol (ULTRAM) 50 mg tablet Take 50 mg by mouth in the morning     • zolpidem (AMBIEN) 10 mg tablet zolpidem 10 mg tablet   TAKE 1 TABLET BY MOUTH AT BEDTIME     • albuterol (VENTOLIN HFA) 90 mcg/act inhaler Inhale 2 puffs every 6 (six) hours as needed for wheezing (Patient not taking: Reported on 7/27/2021) 1 Inhaler 1   • diclofenac sodium (VOLTAREN) 1 % Apply 2 g topically 4 (four) times a day as needed (pain) 100 g 6   • losartan (COZAAR) 25 mg tablet TAKE 1 TABLET BY MOUTH EVERY DAY (Patient not taking: Reported on 9/27/2023) 30 tablet 0   • umeclidinium-vilanterol (Anoro Ellipta) 62.5-25 MCG/INH inhaler Inhale 1 puff daily (Patient not taking: Reported on 7/27/2021) 1 Inhaler 0     No current facility-administered medications for this visit. Allergies  Allergies   Allergen Reactions   • Shellfish-Derived Products - Food Allergy Hives     Other reaction(s): Respiratory distress   • Lisinopril Cough       Past Medical History, Social History, Family History, medications and allergies were reviewed.     Vitals  Vitals:    09/27/23 1427   BP: 126/78   BP Location: Left arm   Patient Position: Sitting   Cuff Size: Standard Pulse: 60   Resp: 18   SpO2: 96%   Weight: 89.4 kg (197 lb)   Height: 5' 10" (1.778 m)       Physical Exam  Physical Exam    Semination he is in no acute distress. Respiratory no distress. Cardiac is regular. Abdomen is soft nontender nondistended.  examination reveals no CVA tenderness. Skin is warm. Extremities without edema.   Neurologic is grossly intact and nonfocal.  Gait normal.  Affect normal  Results  Lab Results   Component Value Date    PSA 2.0 08/03/2023     Lab Results   Component Value Date    K 4.4 08/03/2023    CO2 20 08/03/2023     08/03/2023    BUN 17 08/03/2023    CREATININE 0.99 08/03/2023     Lab Results   Component Value Date    WBC 9.3 08/03/2023    HGB 16.3 08/03/2023    HCT 46.3 08/03/2023    MCV 95 08/03/2023     08/03/2023         Office Urine Dip  No results found for this or any previous visit (from the past 1 hour(s)).]

## 2023-10-02 ENCOUNTER — OFFICE VISIT (OUTPATIENT)
Dept: LAB | Age: 63
End: 2023-10-02
Payer: COMMERCIAL

## 2023-10-02 ENCOUNTER — ANESTHESIA EVENT (OUTPATIENT)
Dept: PERIOP | Facility: HOSPITAL | Age: 63
End: 2023-10-02
Payer: COMMERCIAL

## 2023-10-02 ENCOUNTER — APPOINTMENT (OUTPATIENT)
Dept: LAB | Age: 63
End: 2023-10-02
Payer: COMMERCIAL

## 2023-10-02 DIAGNOSIS — F17.210 SMOKING GREATER THAN 40 PACK YEARS: ICD-10-CM

## 2023-10-02 DIAGNOSIS — Z01.810 PREOP CARDIOVASCULAR EXAM: ICD-10-CM

## 2023-10-02 DIAGNOSIS — E55.9 VITAMIN D DEFICIENCY: ICD-10-CM

## 2023-10-02 DIAGNOSIS — Z13.220 SCREENING CHOLESTEROL LEVEL: ICD-10-CM

## 2023-10-02 DIAGNOSIS — C67.2 MALIGNANT NEOPLASM OF LATERAL WALL OF URINARY BLADDER (HCC): ICD-10-CM

## 2023-10-02 DIAGNOSIS — Z87.39 HISTORY OF GOUT: ICD-10-CM

## 2023-10-02 DIAGNOSIS — I10 ESSENTIAL HYPERTENSION: ICD-10-CM

## 2023-10-02 DIAGNOSIS — R39.89 SUSPECTED UTI: ICD-10-CM

## 2023-10-02 DIAGNOSIS — Z12.5 SCREENING PSA (PROSTATE SPECIFIC ANTIGEN): ICD-10-CM

## 2023-10-02 PROCEDURE — 87086 URINE CULTURE/COLONY COUNT: CPT

## 2023-10-02 PROCEDURE — 93005 ELECTROCARDIOGRAM TRACING: CPT

## 2023-10-03 LAB
ATRIAL RATE: 75 BPM
BACTERIA UR CULT: NORMAL
P AXIS: 211 DEGREES
PR INTERVAL: 180 MS
QRS AXIS: 230 DEGREES
QRSD INTERVAL: 82 MS
QT INTERVAL: 420 MS
QTC INTERVAL: 469 MS
T WAVE AXIS: 129 DEGREES
VENTRICULAR RATE: 75 BPM

## 2023-10-03 PROCEDURE — 93010 ELECTROCARDIOGRAM REPORT: CPT | Performed by: INTERNAL MEDICINE

## 2023-10-04 ENCOUNTER — ANESTHESIA (OUTPATIENT)
Dept: PERIOP | Facility: HOSPITAL | Age: 63
End: 2023-10-04
Payer: COMMERCIAL

## 2023-10-04 ENCOUNTER — HOSPITAL ENCOUNTER (OUTPATIENT)
Facility: HOSPITAL | Age: 63
Setting detail: OUTPATIENT SURGERY
Discharge: HOME | End: 2023-10-04
Attending: UROLOGY | Admitting: UROLOGY
Payer: COMMERCIAL

## 2023-10-04 VITALS
RESPIRATION RATE: 16 BRPM | HEART RATE: 72 BPM | TEMPERATURE: 97.9 F | OXYGEN SATURATION: 98 % | HEIGHT: 70 IN | WEIGHT: 196.43 LBS | DIASTOLIC BLOOD PRESSURE: 86 MMHG | SYSTOLIC BLOOD PRESSURE: 159 MMHG | BODY MASS INDEX: 28.12 KG/M2

## 2023-10-04 DIAGNOSIS — C67.2 MALIGNANT NEOPLASM OF LATERAL WALL OF URINARY BLADDER (HCC): ICD-10-CM

## 2023-10-04 DIAGNOSIS — N32.89 BLADDER MASS: Primary | ICD-10-CM

## 2023-10-04 PROBLEM — I49.49 ECTOPIC BEATS: Status: ACTIVE | Noted: 2023-10-04

## 2023-10-04 LAB
ATRIAL RATE: 74 BPM
ATRIAL RATE: 77 BPM
P AXIS: -21 DEGREES
P AXIS: -24 DEGREES
PR INTERVAL: 182 MS
PR INTERVAL: 184 MS
QRS AXIS: -50 DEGREES
QRS AXIS: -53 DEGREES
QRSD INTERVAL: 84 MS
QRSD INTERVAL: 86 MS
QT INTERVAL: 416 MS
QT INTERVAL: 422 MS
QTC INTERVAL: 468 MS
QTC INTERVAL: 470 MS
T WAVE AXIS: 58 DEGREES
T WAVE AXIS: 58 DEGREES
VENTRICULAR RATE: 74 BPM
VENTRICULAR RATE: 77 BPM

## 2023-10-04 PROCEDURE — 99284 EMERGENCY DEPT VISIT MOD MDM: CPT

## 2023-10-04 PROCEDURE — 88307 TISSUE EXAM BY PATHOLOGIST: CPT | Performed by: STUDENT IN AN ORGANIZED HEALTH CARE EDUCATION/TRAINING PROGRAM

## 2023-10-04 PROCEDURE — 93005 ELECTROCARDIOGRAM TRACING: CPT

## 2023-10-04 PROCEDURE — 52235 CYSTOSCOPY AND TREATMENT: CPT | Performed by: UROLOGY

## 2023-10-04 PROCEDURE — 93010 ELECTROCARDIOGRAM REPORT: CPT | Performed by: INTERNAL MEDICINE

## 2023-10-04 PROCEDURE — 87086 URINE CULTURE/COLONY COUNT: CPT | Performed by: UROLOGY

## 2023-10-04 RX ORDER — PROPOFOL 10 MG/ML
INJECTION, EMULSION INTRAVENOUS AS NEEDED
Status: DISCONTINUED | OUTPATIENT
Start: 2023-10-04 | End: 2023-10-04

## 2023-10-04 RX ORDER — ALBUTEROL SULFATE 2.5 MG/3ML
2.5 SOLUTION RESPIRATORY (INHALATION) ONCE AS NEEDED
Status: DISCONTINUED | OUTPATIENT
Start: 2023-10-04 | End: 2023-10-04 | Stop reason: HOSPADM

## 2023-10-04 RX ORDER — LIDOCAINE HYDROCHLORIDE 20 MG/ML
INJECTION, SOLUTION EPIDURAL; INFILTRATION; INTRACAUDAL; PERINEURAL AS NEEDED
Status: DISCONTINUED | OUTPATIENT
Start: 2023-10-04 | End: 2023-10-04

## 2023-10-04 RX ORDER — FENTANYL CITRATE/PF 50 MCG/ML
25 SYRINGE (ML) INJECTION
Status: DISCONTINUED | OUTPATIENT
Start: 2023-10-04 | End: 2023-10-04 | Stop reason: HOSPADM

## 2023-10-04 RX ORDER — ONDANSETRON 2 MG/ML
4 INJECTION INTRAMUSCULAR; INTRAVENOUS ONCE AS NEEDED
Status: DISCONTINUED | OUTPATIENT
Start: 2023-10-04 | End: 2023-10-04 | Stop reason: HOSPADM

## 2023-10-04 RX ORDER — GLYCOPYRROLATE 0.2 MG/ML
INJECTION INTRAMUSCULAR; INTRAVENOUS AS NEEDED
Status: DISCONTINUED | OUTPATIENT
Start: 2023-10-04 | End: 2023-10-04

## 2023-10-04 RX ORDER — OXYBUTYNIN CHLORIDE 5 MG/1
5 TABLET ORAL 3 TIMES DAILY
Status: DISCONTINUED | OUTPATIENT
Start: 2023-10-04 | End: 2023-10-04 | Stop reason: HOSPADM

## 2023-10-04 RX ORDER — MIDAZOLAM HYDROCHLORIDE 2 MG/2ML
INJECTION, SOLUTION INTRAMUSCULAR; INTRAVENOUS AS NEEDED
Status: DISCONTINUED | OUTPATIENT
Start: 2023-10-04 | End: 2023-10-04

## 2023-10-04 RX ORDER — FENTANYL CITRATE 50 UG/ML
INJECTION, SOLUTION INTRAMUSCULAR; INTRAVENOUS AS NEEDED
Status: DISCONTINUED | OUTPATIENT
Start: 2023-10-04 | End: 2023-10-04

## 2023-10-04 RX ORDER — ONDANSETRON 2 MG/ML
INJECTION INTRAMUSCULAR; INTRAVENOUS AS NEEDED
Status: DISCONTINUED | OUTPATIENT
Start: 2023-10-04 | End: 2023-10-04

## 2023-10-04 RX ORDER — HYDROMORPHONE HCL/PF 1 MG/ML
0.5 SYRINGE (ML) INJECTION EVERY 4 HOURS PRN
OUTPATIENT
Start: 2023-10-04

## 2023-10-04 RX ORDER — SODIUM CHLORIDE 9 MG/ML
125 INJECTION, SOLUTION INTRAVENOUS CONTINUOUS
Status: DISCONTINUED | OUTPATIENT
Start: 2023-10-04 | End: 2023-10-04 | Stop reason: HOSPADM

## 2023-10-04 RX ORDER — HYDROCODONE BITARTRATE AND ACETAMINOPHEN 5; 325 MG/1; MG/1
1 TABLET ORAL EVERY 4 HOURS PRN
Status: DISCONTINUED | OUTPATIENT
Start: 2023-10-04 | End: 2023-10-04 | Stop reason: HOSPADM

## 2023-10-04 RX ORDER — SODIUM CHLORIDE, SODIUM LACTATE, POTASSIUM CHLORIDE, CALCIUM CHLORIDE 600; 310; 30; 20 MG/100ML; MG/100ML; MG/100ML; MG/100ML
125 INJECTION, SOLUTION INTRAVENOUS CONTINUOUS
Status: DISCONTINUED | OUTPATIENT
Start: 2023-10-04 | End: 2023-10-04 | Stop reason: HOSPADM

## 2023-10-04 RX ORDER — PROMETHAZINE HYDROCHLORIDE 25 MG/ML
12.5 INJECTION, SOLUTION INTRAMUSCULAR; INTRAVENOUS ONCE AS NEEDED
Status: DISCONTINUED | OUTPATIENT
Start: 2023-10-04 | End: 2023-10-04 | Stop reason: HOSPADM

## 2023-10-04 RX ORDER — CEFAZOLIN SODIUM 2 G/50ML
2000 SOLUTION INTRAVENOUS ONCE
Status: COMPLETED | OUTPATIENT
Start: 2023-10-04 | End: 2023-10-04

## 2023-10-04 RX ORDER — HYDROMORPHONE HCL/PF 1 MG/ML
0.5 SYRINGE (ML) INJECTION
Status: DISCONTINUED | OUTPATIENT
Start: 2023-10-04 | End: 2023-10-04 | Stop reason: HOSPADM

## 2023-10-04 RX ORDER — HYDROCODONE BITARTRATE AND ACETAMINOPHEN 5; 325 MG/1; MG/1
1 TABLET ORAL EVERY 6 HOURS PRN
Qty: 6 TABLET | Refills: 0 | Status: SHIPPED | OUTPATIENT
Start: 2023-10-04 | End: 2023-10-14

## 2023-10-04 RX ADMIN — SODIUM CHLORIDE, SODIUM LACTATE, POTASSIUM CHLORIDE, AND CALCIUM CHLORIDE: .6; .31; .03; .02 INJECTION, SOLUTION INTRAVENOUS at 12:34

## 2023-10-04 RX ADMIN — FENTANYL CITRATE 25 MCG: 50 INJECTION, SOLUTION INTRAMUSCULAR; INTRAVENOUS at 12:12

## 2023-10-04 RX ADMIN — MIDAZOLAM 2 MG: 1 INJECTION INTRAMUSCULAR; INTRAVENOUS at 12:02

## 2023-10-04 RX ADMIN — SODIUM CHLORIDE, SODIUM LACTATE, POTASSIUM CHLORIDE, AND CALCIUM CHLORIDE 125 ML/HR: .6; .31; .03; .02 INJECTION, SOLUTION INTRAVENOUS at 10:33

## 2023-10-04 RX ADMIN — LIDOCAINE HYDROCHLORIDE 100 MG: 20 INJECTION, SOLUTION EPIDURAL; INFILTRATION; INTRACAUDAL; PERINEURAL at 12:06

## 2023-10-04 RX ADMIN — FENTANYL CITRATE 50 MCG: 50 INJECTION, SOLUTION INTRAMUSCULAR; INTRAVENOUS at 12:26

## 2023-10-04 RX ADMIN — HYDROCODONE BITARTRATE AND ACETAMINOPHEN 1 TABLET: 5; 325 TABLET ORAL at 13:59

## 2023-10-04 RX ADMIN — GLYCOPYRROLATE 0.2 MG: 0.2 INJECTION INTRAMUSCULAR; INTRAVENOUS at 12:18

## 2023-10-04 RX ADMIN — PROPOFOL 200 MG: 10 INJECTION, EMULSION INTRAVENOUS at 12:06

## 2023-10-04 RX ADMIN — ONDANSETRON 4 MG: 2 INJECTION INTRAMUSCULAR; INTRAVENOUS at 12:32

## 2023-10-04 RX ADMIN — CEFAZOLIN SODIUM 2000 MG: 2 SOLUTION INTRAVENOUS at 12:04

## 2023-10-04 RX ADMIN — FENTANYL CITRATE 25 MCG: 50 INJECTION, SOLUTION INTRAMUSCULAR; INTRAVENOUS at 12:16

## 2023-10-04 RX ADMIN — OXYBUTYNIN CHLORIDE 5 MG: 5 TABLET ORAL at 15:09

## 2023-10-04 NOTE — OP NOTE
OPERATIVE REPORT  PATIENT NAME: Radha Lopez    :  1960  MRN: [id number]  Pt Location: [facility] OR ROOM 07    SURGERY DATE: 10/4/2023    Surgeon(s) and Role:     * Maura Brasher MD - Primary    Preop Diagnosis:  Malignant neoplasm of lateral wall of urinary bladder ([address]) [C67.2]    Post-Op Diagnosis Codes:     * Malignant neoplasm of lateral wall of urinary bladder ([address]) [C67.2]    Procedure(s):  Transurethral resection of medium size bladder tumor measuring between 4 and 5 cm    Specimen(s):  ID Type Source Tests Collected by Time Destination   1 : Bladder tumor right lateral wall Tissue Urinary Bladder TISSUE EXAM Maura Brasher MD 10/4/2023 1224    A : Urine culture Urine Urine, Cystoscopic URINE CULTURE Maura Brasher MD 10/4/2023 1223        Estimated Blood Loss:   Minimal    Drains:  21 [place]    Anesthesia Type:   General    Operative Indications:  Malignant neoplasm of lateral wall of urinary bladder ([address]) [C67.2]      Operative Findings:  Medium size 4-5 cm bladder tumor originating from the right lateral wall. Right ureteral orifice uninvolved. Resection performed to the level of fat and therefore decision was made to leave a 20 [place] Hamm catheter and not instill intravesical mitomycin    Complications:   None    Procedure and Technique:  Pratima eCdeño is a 60-year-old male found to have a lung lesion. He had a follow-up CT PET scan which showed the lung lesion was benign, however, a mass was identified within the bladder originating from the right lateral wall. He has a heavy smoking history and upon my review of the CT PET this was most consistent with a bladder tumor. Risk and benefits of transurethral resection of the bladder tumor were discussed and reviewed and informed consent was obtained. The patient was brought to the operating room on 2023. After the smooth induction of general LMA anesthesia, the patient was placed in the dorsolithotomy position. His genitalia is prepped and draped in sterile fashion. Intravenous antibiotics were administered. A timeout was performed with all members the operative team confirming the patient's identity and procedure to be performed. A 26 Romanian rigid resectoscope with 30 degree lens was inserted. The bladder was thoroughly inspected. A 4-5 cm medium size bladder tumor originating from the right lateral wall of the bladder was appreciated. Both ureteral orifice ease were visualized without evidence of involvement with clear E flux of urine. A 26 Romanian monopolar resectoscope loop was then used to resect the tumor. I resected all the way down to the base and on 1 cut there was visualized fat. I completed the resection without difficulty. His abdominal examination remained soft and benign. Once the entire specimen was resected it was removed with the Ellik. I then used electrocautery to obtain hemostasis. Hemostasis was excellent. All specimen chips were sent for pathology. I made the decision to not instill intravesical mitomycin and left a 20 Romanian Hamm catheter to gravity drainage upon departure from the operating room. Overall the patient tolerated the procedure well and there were no complications. The patient was extubated in the operating room transfer the PACU in stable condition at the conclusion of the case.     Patient Disposition:  PACU stable and extubated        SIGNATURE: Kamari Ferreira MD  DATE: October 4, 2023  TIME: 12:45 PM

## 2023-10-04 NOTE — INTERVAL H&P NOTE
H&P reviewed. After examining the patient I find no changes in the patients condition since the H&P had been written. Vitals:    10/04/23 1036   BP: 142/89   Pulse: 63   Resp: 20   Temp: 97.8 °F (36.6 °C)   SpO2: 95%     I discussed and reviewed the CT PET scan which was done to rule out malignancy of the lung, however, a filling defect in the bladder was identified highly suspicious for urothelial carcinoma. Combined with his heavy smoking history this is considered a bladder tumor until proven otherwise. I recommend cystoscopy with transurethral resection of the bladder tumor along with intravesical instillation of mitomycin. Risk of procedure including, but not limited to bleeding, infection, perforation, mitomycin reaction, disease recurrence, and need for additional intervention, procedures, or surgery were discussed and reviewed. Informed consent was obtained.

## 2023-10-04 NOTE — ANESTHESIA POSTPROCEDURE EVALUATION
Post-Op Assessment Note    CV Status:  Stable    Pain management: adequate     Mental Status:  Alert and awake   Hydration Status:  Euvolemic   PONV Controlled:  Controlled   Airway Patency:  Patent      Post Op Vitals Reviewed: Yes      Staff: Anesthesiologist         No notable events documented.     BP      Temp      Pulse     Resp      SpO2      /86   Pulse 72   Temp 97.9 °F (36.6 °C)   Resp 16   Ht 5' 10" (1.778 m)   Wt 89.1 kg (196 lb 6.9 oz)   SpO2 98%   BMI 28.18 kg/m²

## 2023-10-04 NOTE — ANESTHESIA PREPROCEDURE EVALUATION
Procedure:  (TURBT) (Bladder)  INSTILLATION MITOMYCIN (Bladder)    Relevant Problems   CARDIO   (+) Ectopic beats   (+) Essential hypertension      MUSCULOSKELETAL   (+) Lumbosacral spondylosis without myelopathy   (+) Muscle spasms of neck   (+) Osteoarthritis      PULMONARY   (+) COPD, mild (HCC)   (+) Smoking greater than 40 pack years        Physical Exam    Airway    Mallampati score: II  TM Distance: >3 FB  Neck ROM: full     Dental   No notable dental hx     Cardiovascular  Rhythm: regular, Rate: normal, Cardiovascular exam normal    Pulmonary  Pulmonary exam normal Breath sounds clear to auscultation,     Other Findings        Anesthesia Plan  ASA Score- 3     Anesthesia Type- general with ASA Monitors. Additional Monitors:   Airway Plan:     Comment: Preop EKG read as "unusual axis-psb limb reversal". Recheck of chart reveals normal Stress test (Dr. Lilli Gutiérrez) with normal axis. Repeat EKG ordered preop in SDS. Wilma Angles Plan Factors-    Chart reviewed. EKG reviewed. Imaging results reviewed. Existing labs reviewed. Patient summary reviewed. Patient is a current smoker. Patient instructed to abstain from smoking on day of procedure. Patient smoked on day of surgery. There is medical exclusion for perioperative obstructive sleep apnea risk education. Induction- intravenous. Postoperative Plan-     Informed Consent- Anesthetic plan and risks discussed with patient and spouse [facility]).

## 2023-10-05 ENCOUNTER — HOSPITAL ENCOUNTER (EMERGENCY)
Facility: HOSPITAL | Age: 63
Discharge: HOME | End: 2023-10-05
Attending: EMERGENCY MEDICINE
Payer: COMMERCIAL

## 2023-10-05 ENCOUNTER — NURSE TRIAGE (OUTPATIENT)
Age: 63
End: 2023-10-05

## 2023-10-05 ENCOUNTER — TELEPHONE (OUTPATIENT)
Age: 63
End: 2023-10-05

## 2023-10-05 ENCOUNTER — TELEPHONE (OUTPATIENT)
Dept: UROLOGY | Facility: AMBULATORY SURGERY CENTER | Age: 63
End: 2023-10-05

## 2023-10-05 VITALS
OXYGEN SATURATION: 96 % | RESPIRATION RATE: 17 BRPM | TEMPERATURE: 97.9 F | SYSTOLIC BLOOD PRESSURE: 140 MMHG | DIASTOLIC BLOOD PRESSURE: 87 MMHG | HEART RATE: 77 BPM

## 2023-10-05 DIAGNOSIS — R31.9 HEMATURIA: Primary | ICD-10-CM

## 2023-10-05 DIAGNOSIS — N32.89 BLOOD CLOT IN BLADDER: ICD-10-CM

## 2023-10-05 DIAGNOSIS — C67.2 MALIGNANT NEOPLASM OF LATERAL WALL OF URINARY BLADDER (HCC): Primary | ICD-10-CM

## 2023-10-05 PROCEDURE — 76775 US EXAM ABDO BACK WALL LIM: CPT | Performed by: EMERGENCY MEDICINE

## 2023-10-05 PROCEDURE — 99283 EMERGENCY DEPT VISIT LOW MDM: CPT | Performed by: EMERGENCY MEDICINE

## 2023-10-05 RX ORDER — OXYBUTYNIN CHLORIDE 5 MG/1
5 TABLET ORAL 3 TIMES DAILY PRN
Qty: 20 TABLET | Refills: 0 | Status: SHIPPED | OUTPATIENT
Start: 2023-10-05

## 2023-10-05 NOTE — ED ATTENDING ATTESTATION
10/4/2023  IWhit DO, saw and evaluated the patient. I have discussed the patient with the resident/non-physician practitioner and agree with the resident's/non-physician practitioner's findings, Plan of Care, and MDM as documented in the resident's/non-physician practitioner's note, except where noted. All available labs and Radiology studies were reviewed. I was present for key portions of any procedure(s) performed by the resident/non-physician practitioner and I was immediately available to provide assistance. At this point I agree with the current assessment done in the Emergency Department. I have conducted an independent evaluation of this patient a history and physical is as follows:  Medical Decision Making  This is a 41-year-old male presents the emergency department with noted recent TURP procedure earlier this morning. The patient was sent home with noted Hamm at home he started having significant bladder spasms with noted significant hematuria and passing of clots. The patient was unable to void and then he started having some urine noted in the Hamm. Patient presented to the emergency department this point time he has no abdominal discomfort and pain. The Hamm has been flushed in the emergency department with noted passage of clots differential diagnosis includes hematuria with noted clot blocking the Hamm, dysfunctioning Hamm, infection, with noted improvement of symptoms unable to drain the Hamm at this point time and ultrasound at the bedside shows no evidence of retention the plan will be for outpatient management follow-up chemistry instruction when to return back to the emergency department. Pt re-examined and evaluated after testing and treatment. Spoke with the patient and feeling improved and sxs have resolved. Will discharge home with close f/u with pcp and instructed to return to the ED if sxs worsen or continue.  Pt agrees with the plan for discharge and feels comfortable to go home with proper f/u. Advised to return for worsening or additional problems. Diagnostic tests were reviewed and questions answered. Diagnosis, care plan and treatment options were discussed. The patient understand instructions and will follow up as directed. Counseling: I had a detailed discussion with the patient and/or guardian regarding: the historical points, exam findings, and any diagnostic results supporting the discharge diagnosis, lab results, radiology results, discharge instructions reviewed with patient and/or family/caregiver and understanding was verbalized. Instructions given to return to the emergency department if symptoms worsen or persist, or if there are any questions or concerns that arise at home.   All labs reviewed and utilized in the medical decision making process  All radiology studies independently viewed by me and interpreted by the radiologist.        All labs reviewed and interpreted by myself   All radiology studies independently viewed by me and interpreted by myself     No orders to display       Labs Reviewed - No data to display    Clinical Impression:    Final diagnoses:   Hematuria   Blood clot in bladder         ED Course         Critical Care Time  Procedures

## 2023-10-05 NOTE — TELEPHONE ENCOUNTER
reviewed er notes and images  sounds to be flowing better now  i sent some ditropan 5mg take tid prn [facility] sp[facility]  i'm not sure how long FT wants to keep english after turbt yesterday, few days?

## 2023-10-05 NOTE — TELEPHONE ENCOUNTER
Post Op Note    Barbara Hampton is a 59 y.o. male s/p Transurethral resection of medium size bladder tumor measuring between 4 and 5 cm performed 10/4/2023. Barbara Hampton is a patient of Dr. Dr. Arvind Hernandez and is seen at the [facility] office. How would you rate your pain on a scale from 1 to 10, 10 being the worst pain ever? 2  Have you had a fever? No  Have your bowel movements been regular? No  Do you have any difficulty urinating? No  If the patient has a english- are you comfortable caring for your english? Yes Is it draining urine? Yes  Do you have any other questions or concerns that I can address at this time? None currently. Patient states that he had a rough night of bladder spasms last night. He ended up in the ER as the english wasn't draining and he was in pain. He was concerned for a blockage. He states that the pain is at a 2 but every 20 minutes it shoots up when a spasm occurs. He called earlier and Oxybutynin was prescribed for the spasms. Instructed to stay hydrated. Scheduled english removal for Monday and cysto in 3 months.

## 2023-10-05 NOTE — TELEPHONE ENCOUNTER
----- Message from Jignesh Agustin MD sent at 10/4/2023 12:52 PM EDT -----  Shantanu Chacon underwent TURBT. He has a english and needs it removed with RN next week Monday. He then needs f/u to discuss path in 3-4 weeks with KEITH Peralta this could possibly be a phone call. He needs office cysto in 3 months please. Thank you.     FT

## 2023-10-05 NOTE — TELEPHONE ENCOUNTER
Pt reports having bladder spasms. Passing clots and blood. Reports long clots that look like worms. Did go to ER and the flushed it. Reports urine in bag looks like mixed. Pt reports a little better today. Did review what to expect with patient. Reports no other symptoms. Please advise if medication can be sent for spasms.      [facility] [address]     Additional Information  • Urinary catheter, questions about    Protocols used: URINE - BLOOD IN-ADULT-[place]

## 2023-10-05 NOTE — DISCHARGE INSTRUCTIONS
You were seen for hematuria and blood clots in your english. This is expected after surgery. Your english is still functioning appropriately. You can take 975 mg acetaminophen (brand name includes Tylenol) every 6 hours for pain/fever. Call the urology office tomorrow if you continue to have blood in your urine.

## 2023-10-05 NOTE — ED NOTES
ED resident at bedside performing [address] [facility], [address] [place] [address]  10/05/23 7176

## 2023-10-05 NOTE — ED PROVIDER NOTES
History  Chief Complaint   Patient presents with   • Post-op Problem     Per pt - has tumor removed from bladder this am, pt had catheter placed from operation, pt has been having red bloody urine ever since     67-year-old man presents with continued hematuria and suprapubic pain. Patient had trans urethral bladder tumor resection yesterday. Hamm was placed after the procedure. He continues to have hematuria and occasional pain of his suprapubic area. He has noted some small clots. He presents today due to concerns of decreased urine output. He awoke from his sleep tonight with significant suprapubic discomfort. No fever or chills. Prior to Admission Medications   Prescriptions Last Dose Informant Patient Reported? Taking?    HYDROcodone-acetaminophen (NORCO) 5-325 mg per tablet   No No   Sig: Take 1 tablet by mouth every 6 (six) hours as needed for pain for up to 10 days Max Daily Amount: 4 tablets   albuterol (VENTOLIN HFA) 90 mcg/act inhaler  Self No No   Sig: Inhale 2 puffs every 6 (six) hours as needed for wheezing   Patient not taking: Reported on 7/27/2021   clobetasol (TEMOVATE) 0.05 % cream  Self Yes No   Sig: APPLY SPARINGLY TWICE DAILY TO RASH UP TO 2 WEEKS   cyclobenzaprine (FLEXERIL) 10 mg tablet  Self Yes No   Sig: Take 10 mg by mouth if needed   diclofenac sodium (VOLTAREN) 1 %  Self No No   Sig: Apply 2 g topically 4 (four) times a day as needed (pain)   fluticasone (FLONASE) 50 mcg/act nasal spray  Self No No   Sig: SPRAY 2 SPRAYS INTO EACH NOSTRIL EVERY DAY   Patient taking differently: 1 spray into each nostril if needed   losartan (COZAAR) 25 mg tablet  Self No No   Sig: TAKE 1 TABLET BY MOUTH EVERY DAY   Patient not taking: Reported on 9/27/2023   multivitamin (THERAGRAN) TABS  Self Yes No   Sig: Take 1 tablet by mouth daily   sertraline (ZOLOFT) 100 mg tablet  Self Yes No   Sig: sertraline 100 mg tablet   TAKE 1 TABLET BY MOUTH EVERY DAY   tadalafil (CIALIS) 5 MG tablet  Self Yes No   Sig: Take 5 mg by mouth daily as needed   traMADol (ULTRAM) 50 mg tablet  Self Yes No   Sig: Take 50 mg by mouth in the morning   umeclidinium-vilanterol (Anoro Ellipta) 62.5-25 MCG/INH inhaler  Self No No   Sig: Inhale 1 puff daily   Patient not taking: Reported on 7/27/2021   zolpidem (AMBIEN) 10 mg tablet  Self Yes No   Sig: zolpidem 10 mg tablet   TAKE 1 TABLET BY MOUTH AT BEDTIME      Facility-Administered Medications: None       Past Medical History:   Diagnosis Date   • COPD, mild ([address]) 11/01/2019   • COVID-19     2022   • Elevated BP without diagnosis of hypertension 11/01/2019   • Essential hypertension 11/15/2019   • IFG (impaired fasting glucose) 11/01/2019   • Lumbosacral spondylosis without myelopathy 07/31/2019   • Nasal septal perforation    • Recurrent sinusitis 12/16/2019       Past Surgical History:   Procedure Laterality Date   • SEPTOPLASTY      age 41 - resulted in septal perforation   • SINUS SURGERY      age 41 - removal of polyps       Family History   Problem Relation Age of Onset   • Breast cancer Mother    • Heart disease Father    • Cancer Father      I have reviewed and agree with the history as documented. E-Cigarette/Vaping   • E-Cigarette Use Never User      E-Cigarette/Vaping Substances   • Nicotine No    • THC No    • CBD No      Social History     Tobacco Use   • Smoking status: Every Day     Packs/day: 1.00     Years: 40.00     Total pack years: 40.00     Types: Cigarettes   • Smokeless tobacco: Current   • Tobacco comments:     Last smoked 10/4   Vaping Use   • Vaping Use: Never used   Substance Use Topics   • Alcohol use: Not Currently   • Drug use: Never        Review of Systems   Constitutional: Negative for chills and fever. HENT: Negative for ear pain and sore throat. Eyes: Negative for pain and visual disturbance. Respiratory: Negative for cough and shortness of breath. Cardiovascular: Negative for chest pain and palpitations.    Gastrointestinal: Positive for abdominal pain. Negative for constipation, diarrhea and vomiting. Genitourinary: Positive for hematuria. Negative for dysuria. Musculoskeletal: Negative for arthralgias and back pain. Skin: Negative for color change and rash. Neurological: Negative for seizures, syncope and light-headedness. Psychiatric/Behavioral: Negative for agitation and confusion. Physical Exam  ED Triage Vitals [10/04/23 2355]   Temperature Pulse Respirations Blood Pressure SpO2   97.9 °F (36.6 °C) 95 18 (!) 199/93 99 %      Temp Source Heart Rate Source Patient Position - Orthostatic VS BP Location FiO2 (%)   Oral Monitor Sitting Left arm --      Pain Score       5             Orthostatic Vital Signs  Vitals:    10/04/23 2355 10/05/23 0124   BP: (!) 199/93 140/87   Pulse: 95 77   Patient Position - Orthostatic VS: Sitting        Physical Exam  Vitals and nursing note reviewed. Constitutional:       General: He is not in acute distress. Appearance: Normal appearance. HENT:      Head: Normocephalic and atraumatic. Right Ear: External ear normal.      Left Ear: External ear normal.   Cardiovascular:      Rate and Rhythm: Normal rate and regular rhythm. Pulmonary:      Effort: Pulmonary effort is normal. No respiratory distress. Abdominal:      Tenderness: There is abdominal tenderness in the suprapubic area. Genitourinary:     Comments: Hamm with dark red urine and occasional small clots. Some dried blood around glans of penis. Musculoskeletal:         General: Normal range of motion. Cervical back: Normal range of motion and neck supple. Skin:     General: Skin is warm and dry. Neurological:      Mental Status: He is alert and oriented to person, place, and time. Mental status is at baseline.    Psychiatric:         Mood and Affect: Mood normal.         Behavior: Behavior normal.         ED Medications  Medications - No data to display    Diagnostic Studies  Results Reviewed     None No orders to display         Procedures  POC Renal US    Date/Time: 10/5/2023 1:39 AM    Performed by: Carmen Quigley MD  Authorized by: Carmen Quigley MD    Patient location:  ED  Procedure details:     Exam Type:  Diagnostic    Indications: hematuria      Assessment for:  Bladder volume    Views obtained: bladder (transverse and sagittal)      Image quality: diagnostic      Image availability:  Images available in PACS  Findings:     Bladder findings: english within bladder      Bladder findings comment:  Heterogenic material in bladder  Comments:      English correctly and bladder with evidence of blood clot. No significant quantity of urine noted in bladder. ED Course       Medical Decision Making  Presents for worsening hematuria and suprapubic pain. Bedside ultrasound with evidence of thrombus in the bladder presumably from yesterday's bladder tumor resection. English did backflush and draw without significant pain or resistance. A number of smaller clots were evacuated during flushing process. Recheck of ultrasound continues to show some thrombus in the bladder. He did not have continued pain here however. Will discharge and have him follow-up with urology tomorrow. No indication for removal of this English and insertion of three-way irrigation catheter at this time. Patient in agreement with plan and questions were answered. Verbalized understanding of return precautions. Portions or all of this note were generated using voice recognition software. Occasional wrong word or "sound a like" substitutions may have occurred due to the inherent limitations of voice recognition software. Please interpret any errors within the intended context of the whole sentence or idea.           Disposition  Final diagnoses:   Hematuria   Blood clot in bladder     Time reflects when diagnosis was documented in both MDM as applicable and the Disposition within this note     Time User Action Codes Description Comment    10/5/2023  1:15 AM Leandrew Guard Add [R31.9] Hematuria     10/5/2023  1:16 AM Leandrew Guard Add [N32.89] Blood clot in bladder       ED Disposition     ED Disposition   Discharge    Condition   Stable    Date/Time   Thu Oct 5, 2023  1:15 AM    Comment   Heena Julien discharge to home/self care.                Follow-up Information     Follow up With Specialties Details Why Contact Info    Moira Carballo MD Urology Call in 1 day  Caity Tuttle  [phone]            Discharge Medication List as of 10/5/2023  1:17 AM      CONTINUE these medications which have NOT CHANGED    Details   albuterol (VENTOLIN HFA) 90 mcg/act inhaler Inhale 2 puffs every 6 (six) hours as needed for wheezing, Starting Fri 11/1/2019, Normal      clobetasol (TEMOVATE) 0.05 % cream APPLY SPARINGLY TWICE DAILY TO RASH UP TO 2 WEEKS, Historical Med      cyclobenzaprine (FLEXERIL) 10 mg tablet Take 10 mg by mouth if needed, Historical Med      diclofenac sodium (VOLTAREN) 1 % Apply 2 g topically 4 (four) times a day as needed (pain), Starting Thu 1/16/2020, Until Thu 7/20/2023 at 2359, Normal      fluticasone (FLONASE) 50 mcg/act nasal spray SPRAY 2 SPRAYS INTO EACH NOSTRIL EVERY DAY, Normal      HYDROcodone-acetaminophen (NORCO) 5-325 mg per tablet Take 1 tablet by mouth every 6 (six) hours as needed for pain for up to 10 days Max Daily Amount: 4 tablets, Starting Wed 10/4/2023, Until Sat 10/14/2023 at 2359, Normal      losartan (COZAAR) 25 mg tablet TAKE 1 TABLET BY MOUTH EVERY DAY, Normal      multivitamin (THERAGRAN) TABS Take 1 tablet by mouth daily, Historical Med      sertraline (ZOLOFT) 100 mg tablet sertraline 100 mg tablet   TAKE 1 TABLET BY MOUTH EVERY DAY, Historical Med      tadalafil (CIALIS) 5 MG tablet Take 5 mg by mouth daily as needed, Historical Med      traMADol (ULTRAM) 50 mg tablet Take 50 mg by mouth in the morning, Historical Med umeclidinium-vilanterol (Anoro Ellipta) 62.5-25 MCG/INH inhaler Inhale 1 puff daily, Starting Fri 10/23/2020, Normal      zolpidem (AMBIEN) 10 mg tablet zolpidem 10 mg tablet   TAKE 1 TABLET BY MOUTH AT BEDTIME, Historical Med           No discharge procedures on file. PDMP Review     None           ED Provider  Attending physically available and evaluated Radha Lopez. I managed the patient along with the ED Attending.     Electronically Signed by         Lazaro Greco MD  10/05/23 6634

## 2023-10-05 NOTE — TELEPHONE ENCOUNTER
Pt was in the ER last night after a procedure done yesterday.  Pt having spasms and blood clots in urine     Pt was transferred to triage nurse

## 2023-10-06 LAB — BACTERIA UR CULT: NORMAL

## 2023-10-09 ENCOUNTER — PROCEDURE VISIT (OUTPATIENT)
Dept: UROLOGY | Facility: AMBULATORY SURGERY CENTER | Age: 63
End: 2023-10-09

## 2023-10-09 VITALS
WEIGHT: 197.6 LBS | DIASTOLIC BLOOD PRESSURE: 70 MMHG | BODY MASS INDEX: 27.66 KG/M2 | HEART RATE: 52 BPM | SYSTOLIC BLOOD PRESSURE: 110 MMHG | HEIGHT: 71 IN

## 2023-10-09 DIAGNOSIS — N32.89 BLADDER MASS: ICD-10-CM

## 2023-10-09 PROCEDURE — 99024 POSTOP FOLLOW-UP VISIT: CPT

## 2023-10-09 NOTE — PROGRESS NOTES
10/9/2023    Yoanna Garrison is a 59 y.o. male  [id number]    Diagnosis:  Hematuria    Patient presents for english removal s/p TURBT on 10/4/2023 with Dr. Azra Ferrara:  Patient will follow up in 3 months Cystoscopy    Procedure:    Deflated balloon and removed catheter  Patient had no issues IHe is aware to call if he has issues    Vitals:    10/09/23 1032   BP: 110/70   Pulse: (!) 52   Weight: 89.6 kg (197 lb 9.6 oz)   Height: 5' 11" (1.803 m)         Angela Arevalo RN

## 2023-10-11 PROCEDURE — 88307 TISSUE EXAM BY PATHOLOGIST: CPT | Performed by: STUDENT IN AN ORGANIZED HEALTH CARE EDUCATION/TRAINING PROGRAM

## 2023-10-18 ENCOUNTER — TELEPHONE (OUTPATIENT)
Age: 63
End: 2023-10-18

## 2023-10-18 NOTE — TELEPHONE ENCOUNTER
Patient has frequent urination, States he got up 4 times just last night.      He would also like someone to review his test results with him    Quinton Lafleur  [phone]

## 2023-10-19 ENCOUNTER — TELEPHONE (OUTPATIENT)
Dept: UROLOGY | Facility: CLINIC | Age: 63
End: 2023-10-19

## 2023-10-19 DIAGNOSIS — R39.15 URGENCY OF URINATION: Primary | ICD-10-CM

## 2023-10-19 RX ORDER — OXYBUTYNIN CHLORIDE 10 MG/1
10 TABLET, EXTENDED RELEASE ORAL
Qty: 30 TABLET | Refills: 1 | Status: SHIPPED | OUTPATIENT
Start: 2023-10-19 | End: 2023-11-18

## 2023-10-19 NOTE — TELEPHONE ENCOUNTER
I called Cyndie Toscano today and reviewed his pathology which reveals predominantly low-grade superficial bladder cancer. Muscle was present and uninvolved by tumor. We discussed follow-up surveillance cystoscopy in January 2024. He has notable urgency and frequency of urination and I prescribed oxybutynin 10 mg XL daily. The patient was instructed to call sooner if he were to have recurrent gross hematuria.

## 2023-11-01 ENCOUNTER — OFFICE VISIT (OUTPATIENT)
Dept: INTERNAL MEDICINE CLINIC | Age: 63
End: 2023-11-01
Payer: COMMERCIAL

## 2023-11-01 VITALS
DIASTOLIC BLOOD PRESSURE: 86 MMHG | HEART RATE: 106 BPM | BODY MASS INDEX: 27.86 KG/M2 | HEIGHT: 71 IN | TEMPERATURE: 97.9 F | WEIGHT: 199 LBS | OXYGEN SATURATION: 97 % | SYSTOLIC BLOOD PRESSURE: 140 MMHG

## 2023-11-01 DIAGNOSIS — I10 ESSENTIAL HYPERTENSION: ICD-10-CM

## 2023-11-01 DIAGNOSIS — F17.210 CIGARETTE NICOTINE DEPENDENCE WITHOUT COMPLICATION: ICD-10-CM

## 2023-11-01 DIAGNOSIS — H10.33 ACUTE BACTERIAL CONJUNCTIVITIS OF BOTH EYES: Primary | ICD-10-CM

## 2023-11-01 PROCEDURE — 99214 OFFICE O/P EST MOD 30 MIN: CPT | Performed by: INTERNAL MEDICINE

## 2023-11-01 RX ORDER — OFLOXACIN 3 MG/ML
1 SOLUTION/ DROPS OPHTHALMIC 4 TIMES DAILY
Qty: 5 ML | Refills: 0 | Status: SHIPPED | OUTPATIENT
Start: 2023-11-01

## 2023-11-01 NOTE — PROGRESS NOTES
Assessment/Plan:    Acute bacterial conjunctivitis  -We will start patient on ofloxacin eyedrops for 1 week  -Avoid touching the eyes  -He was counseled to follow-up with his PCP if symptoms do not resolve    Essential hypertension  -Patient insists that his blood pressure is usually well controlled at home, even though he states that he checks about once a week. He has not been taking his losartan. -He was counseled to check his blood pressure twice a day, in the morning and in the evening for 2 weeks and to keep a blood pressure log and to call the office if blood pressure is consistently above 130/80 as this may be an indication that he likely needs antihypertensives.  -Of note, a look at his blood pressure trend shows that his blood pressure is occasionally quite elevated with a high of 199/93 over the past month.  -He should try to stick to a heart healthy low-salt diet and consider taking his losartan.  -Follow-up with PCP    Nicotine dependence  -Patient was counseled to quit smoking  -I spent about 3 minutes on smoking cessation counseling  -Patient is precontemplative     Diagnoses and all orders for this visit:    Acute bacterial conjunctivitis of both eyes  -     ofloxacin (OCUFLOX) 0.3 % ophthalmic solution; Administer 1 drop to both eyes 4 (four) times a day Use for 7 days    Essential hypertension    Cigarette nicotine dependence without complication            Tobacco Cessation Counseling: Tobacco cessation counseling was provided. The patient is sincerely urged to quit consumption of tobacco. He is not ready to quit tobacco. Medication options not discussed. Patient is precontemplative. Subjective:      Patient ID: Fanta Ge is a 59 y.o. male. HPI  Patient presents with complaints of left-sided eye symptoms. He states that his left eye felt dry 2 days ago, then felt crusty yesterday with associated redness, and yellowish discharge.   He states that when he blinks it feels scratchy and is very sensitive to sun light   + tearing   + yellowish milkly white discharge  + itchy and not painful and he states that he feels blurry. He states that the right eye is beginning to develop symptoms as well but the left eye is worse. No hx of contact. He denies any history of trauma, fall or motor vehicle accident prior to onset of symptoms. Does not have allergies with itchy eye, ears, nose or throat. He admits to postnasal drip, rhinorrhea on the left side, intermittent cough. He denies fever, chills, night sweats, headache, dizziness, nasal congestion, earache, sore throat, chest pain, shortness of breath, palpitations, nausea, vomiting, abdominal pain, diarrhea, constipation. Smokes a pack a day and has been smoking for over 40 years. He states that this is his "last vice". Does not take his losartan and states that any time he checks his bp at home it is normal.  Of note, he checks once a week      The following portions of the patient's history were reviewed and updated as appropriate: He  has a past medical history of COPD, mild ([address]) (11/01/2019), COVID-19, Elevated BP without diagnosis of hypertension (11/01/2019), Essential hypertension (11/15/2019), IFG (impaired fasting glucose) (11/01/2019), Lumbosacral spondylosis without myelopathy (07/31/2019), Nasal septal perforation, and Recurrent sinusitis (12/16/2019).   He   Patient Active Problem List    Diagnosis Date Noted   • Acute bacterial conjunctivitis of both eyes 11/01/2023   • Ectopic beats 10/04/2023   • Bladder mass 10/04/2023   • Spinal stenosis of cervical region 07/20/2023   • Spinal stenosis of thoracic region 07/20/2023   • Family history of early CAD 07/20/2023   • Primary insomnia 07/20/2023   • Muscle spasms of neck 07/20/2023   • History of gout 03/06/2020   • Osteoarthritis 03/06/2020   • Cough due to ACE inhibitor 12/16/2019   • Essential hypertension 11/15/2019   • COPD, mild ([address]) 11/01/2019   • Calculus of gallbladder without cholecystitis without obstruction 11/01/2019   • Displacement of lumbar intervertebral disc without myelopathy 07/31/2019   • Lumbosacral spondylosis without myelopathy 07/31/2019   • Cervical disc disease 07/31/2019   • Encounter for well adult exam with abnormal findings 07/31/2019   • Cigarette nicotine dependence without complication 29/30/4457   • BMI 28.0-28.9,adult 07/31/2019   • Multiple joint pain 07/31/2019     He  has a past surgical history that includes Sinus surgery; Septoplasty; and pr cysto w/removal of lesions small (N/A, 10/4/2023). His family history includes Breast cancer in his mother; Cancer in his father; Heart disease in his father. He  reports that he has been smoking cigarettes. He started smoking about 49 years ago. He has a 35.00 pack-year smoking history. He has never used smokeless tobacco. He reports that he does not currently use alcohol. He reports that he does not use drugs.   Current Outpatient Medications   Medication Sig Dispense Refill   • cyclobenzaprine (FLEXERIL) 10 mg tablet Take 10 mg by mouth if needed     • fluticasone (FLONASE) 50 mcg/act nasal spray SPRAY 2 SPRAYS INTO EACH NOSTRIL EVERY DAY (Patient taking differently: 1 spray into each nostril if needed) 48 mL 0   • multivitamin (THERAGRAN) TABS Take 1 tablet by mouth daily     • ofloxacin (OCUFLOX) 0.3 % ophthalmic solution Administer 1 drop to both eyes 4 (four) times a day Use for 7 days 5 mL 0   • sertraline (ZOLOFT) 100 mg tablet sertraline 100 mg tablet   TAKE 1 TABLET BY MOUTH EVERY DAY     • tadalafil (CIALIS) 5 MG tablet Take 5 mg by mouth daily as needed     • traMADol (ULTRAM) 50 mg tablet Take 50 mg by mouth in the morning     • zolpidem (AMBIEN) 10 mg tablet zolpidem 10 mg tablet   TAKE 1 TABLET BY MOUTH AT BEDTIME     • albuterol (VENTOLIN HFA) 90 mcg/act inhaler Inhale 2 puffs every 6 (six) hours as needed for wheezing (Patient not taking: Reported on 7/27/2021) 1 Inhaler 1   • clobetasol (TEMOVATE) 0.05 % cream APPLY SPARINGLY TWICE DAILY TO RASH UP TO 2 WEEKS (Patient not taking: Reported on 11/1/2023)     • diclofenac sodium (VOLTAREN) 1 % Apply 2 g topically 4 (four) times a day as needed (pain) 100 g 6   • losartan (COZAAR) 25 mg tablet TAKE 1 TABLET BY MOUTH EVERY DAY (Patient not taking: Reported on 9/27/2023) 30 tablet 0   • oxybutynin (DITROPAN) 5 mg tablet Take 1 tablet (5 mg total) by mouth 3 (three) times a day as needed (bladder spasm) (Patient not taking: Reported on 11/1/2023) 20 tablet 0   • oxybutynin (DITROPAN-XL) 10 MG 24 hr tablet Take 1 tablet (10 mg total) by mouth daily at bedtime for 30 doses (Patient not taking: Reported on 11/1/2023) 30 tablet 1   • umeclidinium-vilanterol (Anoro Ellipta) 62.5-25 MCG/INH inhaler Inhale 1 puff daily (Patient not taking: Reported on 7/27/2021) 1 Inhaler 0     No current facility-administered medications for this visit.      Current Outpatient Medications on File Prior to Visit   Medication Sig   • cyclobenzaprine (FLEXERIL) 10 mg tablet Take 10 mg by mouth if needed   • fluticasone (FLONASE) 50 mcg/act nasal spray SPRAY 2 SPRAYS INTO EACH NOSTRIL EVERY DAY (Patient taking differently: 1 spray into each nostril if needed)   • multivitamin (THERAGRAN) TABS Take 1 tablet by mouth daily   • sertraline (ZOLOFT) 100 mg tablet sertraline 100 mg tablet   TAKE 1 TABLET BY MOUTH EVERY DAY   • tadalafil (CIALIS) 5 MG tablet Take 5 mg by mouth daily as needed   • traMADol (ULTRAM) 50 mg tablet Take 50 mg by mouth in the morning   • zolpidem (AMBIEN) 10 mg tablet zolpidem 10 mg tablet   TAKE 1 TABLET BY MOUTH AT BEDTIME   • albuterol (VENTOLIN HFA) 90 mcg/act inhaler Inhale 2 puffs every 6 (six) hours as needed for wheezing (Patient not taking: Reported on 7/27/2021)   • clobetasol (TEMOVATE) 0.05 % cream APPLY SPARINGLY TWICE DAILY TO RASH UP TO 2 WEEKS (Patient not taking: Reported on 11/1/2023)   • diclofenac sodium (VOLTAREN) 1 % Apply 2 g topically 4 (four) times a day as needed (pain)   • losartan (COZAAR) 25 mg tablet TAKE 1 TABLET BY MOUTH EVERY DAY (Patient not taking: Reported on 9/27/2023)   • oxybutynin (DITROPAN) 5 mg tablet Take 1 tablet (5 mg total) by mouth 3 (three) times a day as needed (bladder spasm) (Patient not taking: Reported on 11/1/2023)   • oxybutynin (DITROPAN-XL) 10 MG 24 hr tablet Take 1 tablet (10 mg total) by mouth daily at bedtime for 30 doses (Patient not taking: Reported on 11/1/2023)   • umeclidinium-vilanterol (Anoro Ellipta) 62.5-25 MCG/INH inhaler Inhale 1 puff daily (Patient not taking: Reported on 7/27/2021)     No current facility-administered medications on file prior to visit. He is allergic to shellfish-derived products - food allergy and lisinopril. .    Review of Systems   Constitutional:  Negative for activity change, chills, fatigue, fever and unexpected weight change. HENT:  Positive for postnasal drip and rhinorrhea (left sided). Negative for ear pain, sinus pressure and sore throat. Eyes:  Positive for photophobia, discharge, redness (swelling) and itching. Negative for pain. Respiratory:  Positive for cough (with occasional cough). Negative for choking, chest tightness, shortness of breath and wheezing. Cardiovascular:  Negative for chest pain, palpitations and leg swelling. Gastrointestinal:  Negative for abdominal pain, constipation, diarrhea, nausea and vomiting. Genitourinary:  Negative for dysuria and hematuria. Musculoskeletal:  Negative for arthralgias, back pain, gait problem, joint swelling, myalgias and neck stiffness. Skin:  Negative for pallor and rash. Neurological:  Negative for dizziness, tremors, seizures, syncope, light-headedness and headaches. Hematological:  Negative for adenopathy. Psychiatric/Behavioral:  Negative for behavioral problems.           Objective:      /86 (BP Location: Left arm, Patient Position: Sitting, Cuff Size: Standard)   Pulse (!) 106 Temp 97.9 °F (36.6 °C) (Temporal)   Ht 5' 11" (1.803 m)   Wt 90.3 kg (199 lb)   SpO2 97%   BMI 27.75 kg/m²          Physical Exam  Constitutional:       General: He is not in acute distress. Appearance: He is well-developed. He is not diaphoretic. HENT:      Head: Normocephalic and atraumatic. Right Ear: External ear normal.      Left Ear: External ear normal.      Nose: Nose normal.      Mouth/Throat:      Mouth: Mucous membranes are dry. Pharynx: Posterior oropharyngeal erythema present. No oropharyngeal exudate. Eyes:      General: No scleral icterus. Right eye: No discharge. Left eye: No discharge. Conjunctiva/sclera:      Right eye: Right conjunctiva is injected. Left eye: Left conjunctiva is injected (injected conjuctiva with tearing and prominent vessels b/l L>R). Pupils: Pupils are equal, round, and reactive to light. Neck:      Thyroid: No thyromegaly. Vascular: No JVD. Trachea: No tracheal deviation. Cardiovascular:      Rate and Rhythm: Normal rate. Rhythm irregular. Heart sounds: Murmur (2/6 systolic murmur maximal in the aortic valve region with an irregular heart rhythm) heard. Systolic murmur is present with a grade of 2/6. No friction rub. No gallop. Pulmonary:      Effort: Pulmonary effort is normal. No respiratory distress. Breath sounds: Examination of the right-lower field reveals rales. Rales (right basilar rales not completely cleared by coughing) present. No wheezing. Chest:      Chest wall: No tenderness. Abdominal:      General: Bowel sounds are normal. There is no distension. Palpations: Abdomen is soft. There is no mass. Tenderness: There is no abdominal tenderness. There is no guarding or rebound. Musculoskeletal:         General: No tenderness or deformity. Normal range of motion. Cervical back: Normal range of motion and neck supple.    Lymphadenopathy:      Cervical: No cervical adenopathy. Skin:     General: Skin is warm and dry. Coloration: Skin is not pale. Findings: No erythema or rash. Neurological:      Mental Status: He is alert and oriented to person, place, and time. Cranial Nerves: No cranial nerve deficit. Motor: No abnormal muscle tone. Coordination: Coordination normal.      Deep Tendon Reflexes: Reflexes are normal and symmetric.    Psychiatric:         Behavior: Behavior normal.           Admission on 10/04/2023, Discharged on 10/04/2023   Component Date Value Ref Range Status   • Ventricular Rate 10/04/2023 74  BPM Final   • Atrial Rate 10/04/2023 74  BPM Final   • OR Interval 10/04/2023 184  ms Final   • QRSD Interval 10/04/2023 86  ms Final   • QT Interval 10/04/2023 422  ms Final   • QTC Interval 10/04/2023 468  ms Final   • P Axis 10/04/2023 -21  degrees Final   • QRS Axis 10/04/2023 -50  degrees Final   • T Wave Axis 10/04/2023 58  degrees Final   • Ventricular Rate 10/04/2023 77  BPM Final   • Atrial Rate 10/04/2023 77  BPM Final   • OR Interval 10/04/2023 182  ms Final   • QRSD Interval 10/04/2023 84  ms Final   • QT Interval 10/04/2023 416  ms Final   • QTC Interval 10/04/2023 470  ms Final   • P Axis 10/04/2023 -24  degrees Final   • QRS Axis 10/04/2023 -53  degrees Final   • T Wave Axis 10/04/2023 58  degrees Final   • Urine Culture 10/04/2023 No Growth <100 cfu/mL   Final   • Case Report 10/04/2023    Final                    Value:Surgical Pathology Report                         Case: [id number]                                   Authorizing Provider:  Valentina Sebastian MD  Collected:           10/04/2023 1224              Ordering Location:     [facility]        Received:            10/04/2023 [id number] [facility] Operating Room                                                     Pathologist:           Leona Rodriguez DO                                                          Specimen: Urinary Bladder, Bladder tumor right lateral wall                                         • Final Diagnosis 10/04/2023    Final                    Value: This result contains rich text formatting which cannot be displayed here. • Note 10/04/2023    Final                    Value: This result contains rich text formatting which cannot be displayed here. • Additional Information 10/04/2023    Final                    Value: This result contains rich text formatting which cannot be displayed here. • Gross Description 10/04/2023    Final                    Value: This result contains rich text formatting which cannot be displayed here. • Clinical Information 10/04/2023    Final                    Value:59year-old male found to have a lung lesion. He had a follow-up CT PET scan which showed the lung lesion was benign, however, a mass was identified within the bladder originating from the right lateral wall. He has a heavy smoking history and upon my review of the CT PET this was most consistent with a bladder tumor. Operative Findings:  Medium size 4-5 cm bladder tumor originating from the right lateral wall. Right ureteral orifice uninvolved.   Resection performed to the level of fat and therefore decision was made to leave a 20 [place] Hamm catheter and not instill intravesical mitomycin   Office Visit on 10/02/2023   Component Date Value Ref Range Status   • Ventricular Rate 10/02/2023 75  BPM Final   • Atrial Rate 10/02/2023 75  BPM Final   • [place] Interval 10/02/2023 180  ms Final   • QRSD Interval 10/02/2023 82  ms Final   • QT Interval 10/02/2023 420  ms Final   • QTC Interval 10/02/2023 469  ms Final   • P Axis 10/02/2023 211  degrees Final   • QRS Axis 10/02/2023 230  degrees Final   • T Wave Axis 10/02/2023 129  degrees Final   Appointment on 10/02/2023   Component Date Value Ref Range Status   • Urine Culture 10/02/2023 No Growth <1000 cfu/mL   Final   Hospital Outpatient Visit on 08/30/2023 Component Date Value Ref Range Status   • POC Glucose 08/30/2023 95  65 - 140 mg/dl Final   Orders Only on 08/03/2023   Component Date Value Ref Range Status   • White Blood Cell Count 08/03/2023 9.3  3.4 - 10.8 x10E3/uL Final   • Red Blood Cell Count 08/03/2023 4.87  4.14 - 5.80 x10E6/uL Final   • Hemoglobin 08/03/2023 16.3  13.0 - 17.7 g/dL Final   • HCT 08/03/2023 46.3  37.5 - 51.0 % Final   • MCV 08/03/2023 95  79 - 97 fL Final   • MCH 08/03/2023 33.5 (H)  26.6 - 33.0 pg Final   • MCHC 08/03/2023 35.2  31.5 - 35.7 g/dL Final   • RDW 08/03/2023 13.0  11.6 - 15.4 % Final   • Platelet Count 81/82/3066 184  150 - 450 x10E3/uL Final   • Neutrophils 08/03/2023 59  Not Estab. % Final   • Lymphocytes 08/03/2023 30  Not Estab. % Final   • Monocytes 08/03/2023 8  Not Estab. % Final   • Eosinophils 08/03/2023 2  Not Estab. % Final   • Basophils PCT 08/03/2023 1  Not Estab. % Final   • Neutrophils (Absolute) 08/03/2023 5.4  1.4 - 7.0 x10E3/uL Final   • Lymphocytes (Absolute) 08/03/2023 2.8  0.7 - 3.1 x10E3/uL Final   • Monocytes (Absolute) 08/03/2023 0.8  0.1 - 0.9 x10E3/uL Final   • Eosinophils (Absolute) 08/03/2023 0.2  0.0 - 0.4 x10E3/uL Final   • Basophils ABS 08/03/2023 0.1  0.0 - 0.2 x10E3/uL Final   • Immature Granulocytes 08/03/2023 0  Not Estab. % Final   • Immature Granulocytes (Absolute) 08/03/2023 0.0  0.0 - 0.1 x10E3/uL Final    Comment: A hand-written panel/profile was received from your office. In  accordance with the [facility] Ambiguous Test Code Policy dated July 1283, we have assigned CBC with Differential/Platelet, Test Code  #[id number] to this request. If this is not the testing you wished to  receive on this specimen, please contact the [facility] Department to clarify the test order. We  appreciate your business.      • Glucose, Random 08/03/2023 93  70 - 99 mg/dL Final   • BUN 08/03/2023 17  8 - 27 mg/dL Final   • Creatinine 08/03/2023 0.99  0.76 - 1.27 mg/dL Final   • eGFR 08/03/2023 87  >59 mL/min/1.73 Final   • SL AMB BUN/CREATININE RATIO 08/03/2023 17  10 - 24 Final   • Sodium 08/03/2023 142  134 - 144 mmol/L Final   • Potassium 08/03/2023 4.4  3.5 - 5.2 mmol/L Final   • Chloride 08/03/2023 105  96 - 106 mmol/L Final   • CO2 08/03/2023 20  20 - 29 mmol/L Final   • CALCIUM 08/03/2023 10.3 (H)  8.6 - 10.2 mg/dL Final   • Protein, Total 08/03/2023 6.7  6.0 - 8.5 g/dL Final   • Albumin 08/03/2023 4.6  3.9 - 4.9 g/dL Final   • Globulin, Total 08/03/2023 2.1  1.5 - 4.5 g/dL Final   • Albumin/Globulin Ratio 08/03/2023 2.2  1.2 - 2.2 Final   • TOTAL BILIRUBIN 08/03/2023 0.4  0.0 - 1.2 mg/dL Final   • Alk Phos Isoenzymes 08/03/2023 82  44 - 121 IU/L Final   • AST 08/03/2023 21  0 - 40 IU/L Final   • ALT 08/03/2023 25  0 - 44 IU/L Final   • Cholesterol, Total 08/03/2023 164  100 - 199 mg/dL Final   • Triglycerides 08/03/2023 82  0 - 149 mg/dL Final   • HDL 08/03/2023 44  >39 mg/dL Final   • LDL Calculated 08/03/2023 104 (H)  0 - 99 mg/dL Final   • Prostate Specific Antigen Total 08/03/2023 2.0  0.0 - 4.0 ng/mL Final    Comment: Roche ECLIA methodology. According to the American Urological Association, Serum PSA should  decrease and remain at undetectable levels after radical  prostatectomy. The AUA defines biochemical recurrence as an initial  PSA value 0.2 ng/mL or greater followed by a subsequent confirmatory  PSA value 0.2 ng/mL or greater. Values obtained with different assay methods or kits cannot be used  interchangeably. Results cannot be interpreted as absolute evidence  of the presence or absence of malignant disease. • 25-HYDROXY VIT D 08/03/2023 78.6  30.0 - 100.0 ng/mL Final    Comment: Vitamin D deficiency has been defined by the [address] [facility] practice guideline as a  level of serum 25-OH vitamin D less than 20 ng/mL (1,2).   The Endocrine Society went on to further define vitamin D  insufficiency as a level between 21 and 29 ng/mL (2).  1. IOM ([place] of Medicine). 2010. Dietary reference     intakes for calcium and D. [place]: [employer]. 2. Rohit MF, [place] NC, Paulie HERNANDEZ, et al.     Evaluation, treatment, and prevention of vitamin D     deficiency: an Endocrine Society clinical practice     guideline. JCEM. 2011 Jul; 96(7):1911-30. • Uric Acid 08/03/2023 6.1  3.8 - 8.4 mg/dL Final               Therapeutic target for gout patients: <6.0   Office Visit on 07/20/2023   Component Date Value Ref Range Status   • Cologuard Result 08/01/2023 Negative  Negative Final    Comment: NEGATIVE TEST RESULT. A negative Cologuard result indicates a low likelihood that a colorectal cancer (CRC) or advanced adenoma (adenomatous polyps with more advanced pre-malignant features)  is present. The chance that a person with a negative Cologuard   test has a colorectal cancer is less than 1 in 1500 (negative predictive value >99.9%) or has an  advanced adenoma is less than  5.3% (negative predictive value 94.7%). These data are based on a prospective cross-sectional study of 10,000 individuals at   average risk for colorectal cancer who were screened with both Cologuard and colonoscopy. (Malika T. et al, N Engl J Med 2014;370(14):2496-2576) The normal value (reference range) for this assay is negative. COLOGUARD RE-SCREENING RECOMMENDATION: Periodic colorectal cancer screening is an important part of preventive healthcare for asymptomatic individuals at average risk for colorectal cancer. Following a negative Cologuard result, the   [facility] Task Force screening guidelines recommend a Cologuard re-screening interval of 3 years. References: American Cancer Society Guideline for Colorectal Cancer Screening: http://jones-molina.com/. org/cancer/colon-rectal-cancer/ibvbwqllg-gwwqvyumi-tgyikas/acs-recommendations.html.; Gilmar BALTAZAR, Hannah CARDOZA, Stew HECK, Colorectal Cancer Screening:   Recommendations for Physicians and Patients from the [facility] Task Force on Colorectal Cancer Screening , Am J Gastroenterology 2017; 530:9874-9830. TEST DESCRIPTION: Composite algorithmic analysis of stool DNA-biomarkers with hemoglobin immunoassay. Quantitative values of individual biomarkers are not reportable and are not associated with individual biomarker result reference ranges. Cologuard is   intended for colorectal cancer screening of adults of either sex, 39 years or older, who are at average-risk for colorectal cancer (CRC). Cologuard has been approved for use by the U.S. FDA. The performance of Cologuard was established in                            a cross   sectional study of average-risk adults aged 51-80. Cologuard performance in patients ages 39 to 52 years was estimated by sub-group analysis of near-age groups. Colonoscopies performed for a positive result may find as the most clinically significant   lesion: colorectal cancer [4.0%], advanced adenoma (including sessile serrated polyps greater than or equal to 1cm diameter) [20%] or non- advanced adenoma [31%]; or no colorectal neoplasia [45%]. These estimates are derived from a prospective   cross-sectional screening study of 10,000 individuals at average risk for colorectal cancer who were screened with both Cologuard and colonoscopy. (Malika SANCHES. et al, [facility] J Med 2014;370(14):9732-0895.) Cologuard may produce a false negative or false   positive result (no colorectal cancer or precancerous polyp present at colonoscopy follow up). A negative Cologuard test result does not guarantee the absence of CRC or advanced adenoma (pre-cancer). The current Cologuard screening in                           [facility] is every 3   years. ([address] [facility]).  Cologuard performance data in a 10,000 patient pivotal study using colonoscopy as the reference method can be accessed at the following location: www.[employer]uaat/results. Additional   description of the Cologuard test process, warnings and precautions can be found at [email or web address].

## 2023-11-08 ENCOUNTER — TELEPHONE (OUTPATIENT)
Age: 63
End: 2023-11-08

## 2023-11-08 NOTE — TELEPHONE ENCOUNTER
Patient was seen on 11/1 for conjunctivitis of both eyes He was ordered ofloxacin eye drops. Not getting any better eye were crusty this morning.  Please advise

## 2023-11-08 NOTE — TELEPHONE ENCOUNTER
Left message on machine for patient. If not feeling any better will need referral to ophthalmology. Asked to call office back with any questions.

## 2023-11-09 DIAGNOSIS — F51.01 PRIMARY INSOMNIA: Primary | ICD-10-CM

## 2023-11-09 NOTE — TELEPHONE ENCOUNTER
Next Office Visit ; 1/31/24        PDMP checked      To soon to fill is not due until friday 11/17/23

## 2023-11-10 RX ORDER — ZOLPIDEM TARTRATE 10 MG/1
10 TABLET ORAL
Qty: 30 TABLET | Refills: 0 | Status: CANCELLED | OUTPATIENT
Start: 2023-11-10

## 2023-11-12 DIAGNOSIS — R39.15 URGENCY OF URINATION: ICD-10-CM

## 2023-11-13 RX ORDER — OXYBUTYNIN CHLORIDE 10 MG/1
10 TABLET, EXTENDED RELEASE ORAL
Qty: 90 TABLET | Refills: 1 | Status: SHIPPED | OUTPATIENT
Start: 2023-11-13 | End: 2024-05-11

## 2023-12-31 PROBLEM — H10.33 ACUTE BACTERIAL CONJUNCTIVITIS OF BOTH EYES: Status: RESOLVED | Noted: 2023-11-01 | Resolved: 2023-12-31

## 2024-02-21 PROBLEM — R05.8 COUGH DUE TO ACE INHIBITOR: Status: RESOLVED | Noted: 2019-12-16 | Resolved: 2024-02-21

## 2024-02-21 PROBLEM — T46.4X5A COUGH DUE TO ACE INHIBITOR: Status: RESOLVED | Noted: 2019-12-16 | Resolved: 2024-02-21

## 2024-10-29 DIAGNOSIS — Z00.6 ENCOUNTER FOR EXAMINATION FOR NORMAL COMPARISON OR CONTROL IN CLINICAL RESEARCH PROGRAM: ICD-10-CM

## (undated) DEVICE — DRAPE EQUIPMENT RF WAND

## (undated) DEVICE — SINGLE PORT MANIFOLD: Brand: NEPTUNE 2

## (undated) DEVICE — ADAPTOR CATH

## (undated) DEVICE — DECANTER: Brand: UNBRANDED

## (undated) DEVICE — GLOVE SRG BIOGEL ECLIPSE 7.5

## (undated) DEVICE — SPECIMEN CONTAINER STERILE PEEL PACK

## (undated) DEVICE — CATHETER PLUG WITH CAP: Brand: DOVER

## (undated) DEVICE — BAG URINE DRAINAGE 2000ML ANTI RFLX LF

## (undated) DEVICE — UROLOGIC DRAIN BAG: Brand: UNBRANDED

## (undated) DEVICE — 4-PORT MANIFOLD: Brand: NEPTUNE 2

## (undated) DEVICE — TUBING SUCTION 5MM X 12 FT

## (undated) DEVICE — GLOVE SRG BIOGEL 7.5

## (undated) DEVICE — SCD SEQUENTIAL COMPRESSION COMFORT SLEEVE MEDIUM KNEE LENGTH: Brand: KENDALL SCD

## (undated) DEVICE — GLOVE INDICATOR PI UNDERGLOVE SZ 8 BLUE

## (undated) DEVICE — CATH FOLEY 18FR 5ML 2WAY LUBRICATH

## (undated) DEVICE — BASIC SINGLE BASIN-LF: Brand: MEDLINE INDUSTRIES, INC.

## (undated) DEVICE — PACK TUR

## (undated) DEVICE — EVACUATOR BLADDER ELLIK DISP STRL

## (undated) DEVICE — CHEMOTHERAPY CONTAINER,HINGED LID, YELLOW: Brand: SHARPSAFETY